# Patient Record
Sex: FEMALE | Race: WHITE | Employment: FULL TIME | URBAN - METROPOLITAN AREA
[De-identification: names, ages, dates, MRNs, and addresses within clinical notes are randomized per-mention and may not be internally consistent; named-entity substitution may affect disease eponyms.]

---

## 2020-10-11 ENCOUNTER — APPOINTMENT (OUTPATIENT)
Dept: LAB | Facility: HOSPITAL | Age: 70
End: 2020-10-11
Attending: NURSE PRACTITIONER
Payer: MEDICARE

## 2020-10-11 DIAGNOSIS — Z01.818 PRE-OP TESTING: ICD-10-CM

## 2020-10-11 PROCEDURE — 84443 ASSAY THYROID STIM HORMONE: CPT | Performed by: NURSE PRACTITIONER

## 2020-10-11 PROCEDURE — 80053 COMPREHEN METABOLIC PANEL: CPT | Performed by: NURSE PRACTITIONER

## 2020-10-11 PROCEDURE — 85025 COMPLETE CBC W/AUTO DIFF WBC: CPT | Performed by: NURSE PRACTITIONER

## 2020-10-11 PROCEDURE — 36415 COLL VENOUS BLD VENIPUNCTURE: CPT | Performed by: NURSE PRACTITIONER

## 2020-10-11 PROCEDURE — 86140 C-REACTIVE PROTEIN: CPT | Performed by: NURSE PRACTITIONER

## 2020-10-14 PROBLEM — R19.7 DIARRHEA, UNSPECIFIED: Status: ACTIVE | Noted: 2020-10-14

## 2023-05-16 ENCOUNTER — OFFICE VISIT (OUTPATIENT)
Facility: LOCATION | Age: 73
End: 2023-05-16
Payer: MEDICARE

## 2023-05-16 VITALS — HEART RATE: 101 BPM | TEMPERATURE: 97 F

## 2023-05-16 DIAGNOSIS — K43.9 VENTRAL HERNIA WITHOUT OBSTRUCTION OR GANGRENE: Primary | ICD-10-CM

## 2023-05-16 PROCEDURE — 1160F RVW MEDS BY RX/DR IN RCRD: CPT | Performed by: STUDENT IN AN ORGANIZED HEALTH CARE EDUCATION/TRAINING PROGRAM

## 2023-05-16 PROCEDURE — 1159F MED LIST DOCD IN RCRD: CPT | Performed by: STUDENT IN AN ORGANIZED HEALTH CARE EDUCATION/TRAINING PROGRAM

## 2023-05-16 PROCEDURE — 99203 OFFICE O/P NEW LOW 30 MIN: CPT | Performed by: STUDENT IN AN ORGANIZED HEALTH CARE EDUCATION/TRAINING PROGRAM

## 2023-06-26 ENCOUNTER — HOSPITAL ENCOUNTER (OUTPATIENT)
Dept: CT IMAGING | Age: 73
Discharge: HOME | End: 2023-06-26
Attending: STUDENT IN AN ORGANIZED HEALTH CARE EDUCATION/TRAINING PROGRAM
Payer: MEDICARE

## 2023-06-26 ENCOUNTER — TELEPHONE (OUTPATIENT)
Facility: LOCATION | Age: 73
End: 2023-06-26

## 2023-06-26 DIAGNOSIS — K43.9 VENTRAL HERNIA WITHOUT OBSTRUCTION OR GANGRENE: ICD-10-CM

## 2023-06-26 LAB
CREAT BLD-MCNC: 1 MG/DL
GFR SERPLBLD BASED ON 1.73 SQ M-ARVRAT: 59 ML/MIN/1.73M2 (ref 60–?)

## 2023-06-26 PROCEDURE — 82565 ASSAY OF CREATININE: CPT

## 2023-06-26 PROCEDURE — 74177 CT ABD & PELVIS W/CONTRAST: CPT | Performed by: STUDENT IN AN ORGANIZED HEALTH CARE EDUCATION/TRAINING PROGRAM

## 2023-06-26 NOTE — TELEPHONE ENCOUNTER
Reviewed chart, per last OV note only CT was ordered and discussed. Called and left detailed message nothing from Dr. Vanessa Loja was noted about   EKG, however if another dept called and told her asked pt to call back and we can give her PAT dept number.

## 2023-06-26 NOTE — TELEPHONE ENCOUNTER
Hi,  The patient called in and she is scheduled to have hernia surgery with , but she was suppose to have an EKG done before the surgery and the order still have not been sent. She was wondering when will she have the EKG done. In all just want a confirmation.     Call back # [phone]

## 2023-07-03 RX ORDER — ROSUVASTATIN CALCIUM 10 MG/1
10 TABLET, COATED ORAL NIGHTLY
COMMUNITY

## 2023-07-03 RX ORDER — LEVOTHYROXINE SODIUM 112 UG/1
112 TABLET ORAL
COMMUNITY

## 2023-07-11 ENCOUNTER — TELEPHONE (OUTPATIENT)
Facility: LOCATION | Age: 73
End: 2023-07-11

## 2023-07-11 NOTE — TELEPHONE ENCOUNTER
Transaction ID: [id number] ID: [id number] Date: 2023-07-11  Shyla Hugo Patient  Member ID  [id number]    Date of Birth  [id number]    Gender  Female    Eligibility Status  Active Coverage    Group Number  [id number]    Plan / Coverage Date  2023-01-01    Transaction Type  Outpatient Authorization    Organization  [id number] (COMMERCIAL & MEDICARE)    [facility] logo     Certificate Information  Reference Number  NA    Status  NO ACTION REQUIRED    Member Information  Patient Name  Shyla Hugo    Patient Date of Birth  [id number]    Patient Gender  Female    Member ID  [id number]    Relationship to [address] Name  Shyla Hugo    Requesting Provider     Name  [address] [place] [address], [address]  [phone]    Provider Role  Provider    Address  [facility], [address] [facility], [address] [facility]    Phone  [phone]  Fax  [phone]    Contact Name  Ashley Mercedes  of [facility]  22 - On [address] [place] [address]    Diagnosis Code 1  D896 - Ventral hernia without obstruction or gangrene    Procedure Code 1 (CPT/HCPCS)  13099 - RPR AA HRN 1ST 3-10 NCR/STRN    Quantity  1 Units    Procedure From - To Date  2023-07-12    Status  NO ACTION REQUIRED    Message  NO PRECERT REQUIRED PLEASE REFER TO THE PROVIDER CODE SEARCH TOOL ON AET WEBSITE THE REQUESTED SERVICE MAY NOT BE ELIGIBLE FOR COVERAGE REFER TO ONLINE CLINICAL POLICY BULLETINS USING [address]    Rendering Provider/Facility     Provider 1  Name  [address] [place] [address], [address]  [phone]    Provider Role  Attending    Provider 2  Name  [facility]  [phone]    Provider Role  Facility

## 2023-07-12 ENCOUNTER — ANESTHESIA EVENT (OUTPATIENT)
Dept: SURGERY | Facility: HOSPITAL | Age: 73
End: 2023-07-12
Payer: MEDICARE

## 2023-07-12 ENCOUNTER — HOSPITAL ENCOUNTER (OUTPATIENT)
Facility: HOSPITAL | Age: 73
Setting detail: HOSPITAL OUTPATIENT SURGERY
Discharge: HOME | End: 2023-07-12
Attending: STUDENT IN AN ORGANIZED HEALTH CARE EDUCATION/TRAINING PROGRAM | Admitting: STUDENT IN AN ORGANIZED HEALTH CARE EDUCATION/TRAINING PROGRAM
Payer: MEDICARE

## 2023-07-12 ENCOUNTER — ANESTHESIA (OUTPATIENT)
Dept: SURGERY | Facility: HOSPITAL | Age: 73
End: 2023-07-12
Payer: MEDICARE

## 2023-07-12 VITALS
SYSTOLIC BLOOD PRESSURE: 100 MMHG | HEART RATE: 60 BPM | BODY MASS INDEX: 26.79 KG/M2 | DIASTOLIC BLOOD PRESSURE: 53 MMHG | WEIGHT: 156.94 LBS | TEMPERATURE: 97 F | OXYGEN SATURATION: 94 % | HEIGHT: 64 IN | RESPIRATION RATE: 18 BRPM

## 2023-07-12 PROBLEM — R19.7 DIARRHEA, UNSPECIFIED: Status: RESOLVED | Noted: 2020-10-14 | Resolved: 2023-07-12

## 2023-07-12 PROCEDURE — 49593 RPR AA HRN 1ST 3-10 RDC: CPT | Performed by: STUDENT IN AN ORGANIZED HEALTH CARE EDUCATION/TRAINING PROGRAM

## 2023-07-12 PROCEDURE — 8E0W4CZ ROBOTIC ASSISTED PROCEDURE OF TRUNK REGION, PERCUTANEOUS ENDOSCOPIC APPROACH: ICD-10-PCS | Performed by: STUDENT IN AN ORGANIZED HEALTH CARE EDUCATION/TRAINING PROGRAM

## 2023-07-12 PROCEDURE — 0WUF4JZ SUPPLEMENT ABDOMINAL WALL WITH SYNTHETIC SUBSTITUTE, PERCUTANEOUS ENDOSCOPIC APPROACH: ICD-10-PCS | Performed by: STUDENT IN AN ORGANIZED HEALTH CARE EDUCATION/TRAINING PROGRAM

## 2023-07-12 PROCEDURE — 49593 RPR AA HRN 1ST 3-10 RDC: CPT

## 2023-07-12 DEVICE — GORE SYNECOR PREPERITONEAL 10CMX15CM OVAL BIOMATERIAL
Type: IMPLANTABLE DEVICE | Site: ABDOMEN | Status: FUNCTIONAL
Brand: GORE SYNECOR PREPERITONEAL BIOMATERIAL

## 2023-07-12 RX ORDER — DIPHENHYDRAMINE HYDROCHLORIDE 50 MG/ML
12.5 INJECTION INTRAMUSCULAR; INTRAVENOUS AS NEEDED
Status: DISCONTINUED | OUTPATIENT
Start: 2023-07-12 | End: 2023-07-12

## 2023-07-12 RX ORDER — SODIUM CHLORIDE, SODIUM LACTATE, POTASSIUM CHLORIDE, CALCIUM CHLORIDE 600; 310; 30; 20 MG/100ML; MG/100ML; MG/100ML; MG/100ML
INJECTION, SOLUTION INTRAVENOUS CONTINUOUS
Status: DISCONTINUED | OUTPATIENT
Start: 2023-07-12 | End: 2023-07-12

## 2023-07-12 RX ORDER — HYDROCODONE BITARTRATE AND ACETAMINOPHEN 5; 325 MG/1; MG/1
2 TABLET ORAL ONCE AS NEEDED
Status: COMPLETED | OUTPATIENT
Start: 2023-07-12 | End: 2023-07-12

## 2023-07-12 RX ORDER — ONDANSETRON 2 MG/ML
4 INJECTION INTRAMUSCULAR; INTRAVENOUS EVERY 6 HOURS PRN
Status: DISCONTINUED | OUTPATIENT
Start: 2023-07-12 | End: 2023-07-12

## 2023-07-12 RX ORDER — CEFAZOLIN SODIUM/WATER 2 G/20 ML
2 SYRINGE (ML) INTRAVENOUS ONCE
Status: COMPLETED | OUTPATIENT
Start: 2023-07-12 | End: 2023-07-12

## 2023-07-12 RX ORDER — HEPARIN SODIUM 5000 [USP'U]/ML
5000 INJECTION, SOLUTION INTRAVENOUS; SUBCUTANEOUS ONCE
Status: COMPLETED | OUTPATIENT
Start: 2023-07-12 | End: 2023-07-12

## 2023-07-12 RX ORDER — ACETAMINOPHEN 325 MG/1
650 TABLET ORAL ONCE
Status: DISCONTINUED | OUTPATIENT
Start: 2023-07-12 | End: 2023-07-12

## 2023-07-12 RX ORDER — ACETAMINOPHEN 500 MG
1000 TABLET ORAL ONCE AS NEEDED
Status: COMPLETED | OUTPATIENT
Start: 2023-07-12 | End: 2023-07-12

## 2023-07-12 RX ORDER — HYDROMORPHONE HYDROCHLORIDE 1 MG/ML
0.6 INJECTION, SOLUTION INTRAMUSCULAR; INTRAVENOUS; SUBCUTANEOUS EVERY 5 MIN PRN
Status: DISCONTINUED | OUTPATIENT
Start: 2023-07-12 | End: 2023-07-12

## 2023-07-12 RX ORDER — CYCLOBENZAPRINE HCL 5 MG
5 TABLET ORAL 3 TIMES DAILY
Qty: 15 TABLET | Refills: 0 | Status: SHIPPED | OUTPATIENT
Start: 2023-07-12

## 2023-07-12 RX ORDER — METOCLOPRAMIDE HYDROCHLORIDE 5 MG/ML
10 INJECTION INTRAMUSCULAR; INTRAVENOUS EVERY 8 HOURS PRN
Status: DISCONTINUED | OUTPATIENT
Start: 2023-07-12 | End: 2023-07-12

## 2023-07-12 RX ORDER — GLYCOPYRROLATE 0.2 MG/ML
INJECTION, SOLUTION INTRAMUSCULAR; INTRAVENOUS AS NEEDED
Status: DISCONTINUED | OUTPATIENT
Start: 2023-07-12 | End: 2023-07-12 | Stop reason: SURG

## 2023-07-12 RX ORDER — LABETALOL HYDROCHLORIDE 5 MG/ML
5 INJECTION, SOLUTION INTRAVENOUS EVERY 5 MIN PRN
Status: DISCONTINUED | OUTPATIENT
Start: 2023-07-12 | End: 2023-07-12

## 2023-07-12 RX ORDER — MEPERIDINE HYDROCHLORIDE 25 MG/ML
12.5 INJECTION INTRAMUSCULAR; INTRAVENOUS; SUBCUTANEOUS AS NEEDED
Status: DISCONTINUED | OUTPATIENT
Start: 2023-07-12 | End: 2023-07-12

## 2023-07-12 RX ORDER — MIDAZOLAM HYDROCHLORIDE 1 MG/ML
INJECTION INTRAMUSCULAR; INTRAVENOUS AS NEEDED
Status: DISCONTINUED | OUTPATIENT
Start: 2023-07-12 | End: 2023-07-12 | Stop reason: SURG

## 2023-07-12 RX ORDER — ROCURONIUM BROMIDE 10 MG/ML
INJECTION, SOLUTION INTRAVENOUS AS NEEDED
Status: DISCONTINUED | OUTPATIENT
Start: 2023-07-12 | End: 2023-07-12 | Stop reason: SURG

## 2023-07-12 RX ORDER — NALOXONE HYDROCHLORIDE 0.4 MG/ML
80 INJECTION, SOLUTION INTRAMUSCULAR; INTRAVENOUS; SUBCUTANEOUS AS NEEDED
Status: DISCONTINUED | OUTPATIENT
Start: 2023-07-12 | End: 2023-07-12

## 2023-07-12 RX ORDER — NEOSTIGMINE METHYLSULFATE 1 MG/ML
INJECTION, SOLUTION INTRAVENOUS AS NEEDED
Status: DISCONTINUED | OUTPATIENT
Start: 2023-07-12 | End: 2023-07-12 | Stop reason: SURG

## 2023-07-12 RX ORDER — HYDROMORPHONE HYDROCHLORIDE 1 MG/ML
0.4 INJECTION, SOLUTION INTRAMUSCULAR; INTRAVENOUS; SUBCUTANEOUS EVERY 5 MIN PRN
Status: DISCONTINUED | OUTPATIENT
Start: 2023-07-12 | End: 2023-07-12

## 2023-07-12 RX ORDER — OXYCODONE HYDROCHLORIDE 5 MG/1
5 TABLET ORAL EVERY 6 HOURS PRN
Qty: 30 TABLET | Refills: 0 | Status: SHIPPED | OUTPATIENT
Start: 2023-07-12 | End: 2023-07-12

## 2023-07-12 RX ORDER — HYDROCODONE BITARTRATE AND ACETAMINOPHEN 5; 325 MG/1; MG/1
1 TABLET ORAL ONCE AS NEEDED
Status: COMPLETED | OUTPATIENT
Start: 2023-07-12 | End: 2023-07-12

## 2023-07-12 RX ORDER — BUPIVACAINE HYDROCHLORIDE 2.5 MG/ML
INJECTION, SOLUTION EPIDURAL; INFILTRATION; INTRACAUDAL AS NEEDED
Status: DISCONTINUED | OUTPATIENT
Start: 2023-07-12 | End: 2023-07-12 | Stop reason: HOSPADM

## 2023-07-12 RX ORDER — HYDROMORPHONE HYDROCHLORIDE 1 MG/ML
0.2 INJECTION, SOLUTION INTRAMUSCULAR; INTRAVENOUS; SUBCUTANEOUS EVERY 5 MIN PRN
Status: DISCONTINUED | OUTPATIENT
Start: 2023-07-12 | End: 2023-07-12

## 2023-07-12 RX ORDER — ONDANSETRON 2 MG/ML
INJECTION INTRAMUSCULAR; INTRAVENOUS AS NEEDED
Status: DISCONTINUED | OUTPATIENT
Start: 2023-07-12 | End: 2023-07-12 | Stop reason: SURG

## 2023-07-12 RX ORDER — ACETAMINOPHEN 500 MG
1000 TABLET ORAL ONCE
Status: DISCONTINUED | OUTPATIENT
Start: 2023-07-12 | End: 2023-07-12 | Stop reason: HOSPADM

## 2023-07-12 RX ORDER — KETOROLAC TROMETHAMINE 30 MG/ML
INJECTION, SOLUTION INTRAMUSCULAR; INTRAVENOUS AS NEEDED
Status: DISCONTINUED | OUTPATIENT
Start: 2023-07-12 | End: 2023-07-12 | Stop reason: SURG

## 2023-07-12 RX ORDER — HYDROMORPHONE HYDROCHLORIDE 1 MG/ML
INJECTION, SOLUTION INTRAMUSCULAR; INTRAVENOUS; SUBCUTANEOUS
Status: COMPLETED
Start: 2023-07-12 | End: 2023-07-12

## 2023-07-12 RX ORDER — DEXAMETHASONE SODIUM PHOSPHATE 4 MG/ML
VIAL (ML) INJECTION AS NEEDED
Status: DISCONTINUED | OUTPATIENT
Start: 2023-07-12 | End: 2023-07-12 | Stop reason: SURG

## 2023-07-12 RX ORDER — OXYCODONE HYDROCHLORIDE 5 MG/1
5 TABLET ORAL EVERY 6 HOURS PRN
Qty: 30 TABLET | Refills: 0 | Status: SHIPPED | OUTPATIENT
Start: 2023-07-12

## 2023-07-12 RX ORDER — LIDOCAINE HYDROCHLORIDE 10 MG/ML
INJECTION, SOLUTION EPIDURAL; INFILTRATION; INTRACAUDAL; PERINEURAL AS NEEDED
Status: DISCONTINUED | OUTPATIENT
Start: 2023-07-12 | End: 2023-07-12 | Stop reason: SURG

## 2023-07-12 RX ADMIN — CEFAZOLIN SODIUM/WATER 2 G: 2 G/20 ML SYRINGE (ML) INTRAVENOUS at 10:23:00

## 2023-07-12 RX ADMIN — SODIUM CHLORIDE, SODIUM LACTATE, POTASSIUM CHLORIDE, CALCIUM CHLORIDE: 600; 310; 30; 20 INJECTION, SOLUTION INTRAVENOUS at 10:32:00

## 2023-07-12 RX ADMIN — MIDAZOLAM HYDROCHLORIDE 2 MG: 1 INJECTION INTRAMUSCULAR; INTRAVENOUS at 10:12:00

## 2023-07-12 RX ADMIN — NEOSTIGMINE METHYLSULFATE 3 MG: 1 INJECTION, SOLUTION INTRAVENOUS at 12:45:00

## 2023-07-12 RX ADMIN — ONDANSETRON 4 MG: 2 INJECTION INTRAMUSCULAR; INTRAVENOUS at 10:31:00

## 2023-07-12 RX ADMIN — KETOROLAC TROMETHAMINE 15 MG: 30 INJECTION, SOLUTION INTRAMUSCULAR; INTRAVENOUS at 12:43:00

## 2023-07-12 RX ADMIN — ROCURONIUM BROMIDE 50 MG: 10 INJECTION, SOLUTION INTRAVENOUS at 10:14:00

## 2023-07-12 RX ADMIN — LIDOCAINE HYDROCHLORIDE 50 MG: 10 INJECTION, SOLUTION EPIDURAL; INFILTRATION; INTRACAUDAL; PERINEURAL at 10:14:00

## 2023-07-12 RX ADMIN — SODIUM CHLORIDE, SODIUM LACTATE, POTASSIUM CHLORIDE, CALCIUM CHLORIDE: 600; 310; 30; 20 INJECTION, SOLUTION INTRAVENOUS at 10:10:00

## 2023-07-12 RX ADMIN — DEXAMETHASONE SODIUM PHOSPHATE 8 MG: 4 MG/ML VIAL (ML) INJECTION at 10:31:00

## 2023-07-12 RX ADMIN — GLYCOPYRROLATE 0.3 MG: 0.2 INJECTION, SOLUTION INTRAMUSCULAR; INTRAVENOUS at 10:25:00

## 2023-07-12 RX ADMIN — GLYCOPYRROLATE 0.4 MG: 0.2 INJECTION, SOLUTION INTRAMUSCULAR; INTRAVENOUS at 12:45:00

## 2023-07-12 NOTE — H&P
New Patient Visit Note       Active Problems      No diagnosis found. Chief Complaint   No chief complaint on file. History of Present Illness   74year-old very pleasant lady who was referred to me by Dr. Mallika Hung for evaluation of ventral hernia. She reports noting this hernia for a while however has been recently more notable after some weight loss. She is also had increased her exercise regimen and is performing core exercises which causes discomfort and pain at the area of the hernia lately. To me she reports the pain is mild and intermittent and aggravated with movement. She denies any fevers chills, nausea or vomiting, diarrhea or constipation, signs of incarceration of the hernia erythema in the area. She had prior laparoscopic surgery for tubal ligation and a duct ectomy of the right breast.  She is current with her colonoscopy screening. She is here for planned procedure. No changes since her office visit with me. Allergies  Shilpa Jobs has No Known Allergies. Past Medical / Surgical / Social / Family History    The past medical and past surgical history have been reviewed by me today.     Past Medical History:   Diagnosis Date    allergic rhinitis     anemia     in past and egd and colon neg    Arthritis     Cancer ([facility] [address]     basal cell cancer    Depression     Esophageal reflux     Fatigue     Food intolerance     gluten     gluten    High cholesterol     hypothyroidism     Irregular bowel habits     osteopenia     Osteoporosis     Sleep apnea     cpap    Thyroid disease      Past Surgical History:   Procedure Laterality Date    CATARACT  04/2021    both eyes    COLONOSCOPY      2002 Dr. Waleska Sung    COLONOSCOPY  01/2010    due 2015    OTHER  2000    ductectomy rt breast    OTHER SURGICAL HISTORY      laproscopy 84, cervical dysplasia with cone biopsy 84    OTHER SURGICAL HISTORY Bilateral 2018    oopherectomy    SKIN SURGERY  12/07/2016    MMS for BCC-infil to the  Nares-AB    SKIN SURGERY      BCC to Left Superior Nasolabial Fold [facility] dr Ed Arciniega       The family history and social history have been reviewed by me today. Family History   Problem Relation Age of Onset    Diabetes Father     Hypertension Mother     Cancer Mother         melanoma    Cancer Brother         melanoma     Social History    Socioeconomic History      Marital status:     Tobacco Use      Smoking status: Never      Smokeless tobacco: Never    Vaping Use      Vaping Use: Never used    Substance and Sexual Activity      Alcohol use: Yes        Alcohol/week: 2.0 standard drinks of alcohol        Types: 2 Glasses of wine per week      Drug use: No     No current outpatient medications on file. Review of Systems  The Review of Systems has been reviewed by me during today. Review of Systems   Constitutional:  Negative for chills, diaphoresis, fatigue and fever. HENT:  Negative for ear discharge, ear pain and sore throat. Eyes:  Negative for pain and discharge. Respiratory:  Negative for cough, chest tightness and shortness of breath. Cardiovascular:  Negative for chest pain, palpitations and leg swelling. Gastrointestinal:  Negative for abdominal distention, abdominal pain, blood in stool, constipation, diarrhea, nausea and vomiting. Genitourinary:  Negative for dysuria, frequency, hematuria and urgency. Skin:  Negative for color change, pallor and rash. Neurological:  Negative for weakness, light-headedness, numbness and headaches. Hematological:  Negative for adenopathy. Does not bruise/bleed easily. Psychiatric/Behavioral:  Negative for agitation and confusion. Physical Findings   Physical Exam  Constitutional:       Appearance: Normal appearance. HENT:      Head: Normocephalic and atraumatic. Cardiovascular:      Pulses: Normal pulses. Pulmonary:      Effort: Pulmonary effort is normal.   Abdominal:      General: Abdomen is flat. There is no distension.       Palpations: Abdomen is soft. There is no hepatomegaly or splenomegaly. Tenderness: There is abdominal tenderness in the suprapubic area. There is no guarding or rebound. Hernia: A hernia is present. Hernia is present in the ventral area. There is no hernia in the left inguinal area or right inguinal area. Skin:     General: Skin is warm. Capillary Refill: Capillary refill takes less than 2 seconds. Neurological:      Mental Status: She is alert and oriented to person, place, and time. Mental status is at baseline. Assessment   No diagnosis found. Bobbi Willoughby is a 74year old female referred by Dr. Sheeba Maxwell for evaluation of ventral hernia. She has a proximately 3 to 4 cm ventral hernia in the supraumbilical site. This is likely a an incisional hernia due to her prior laparoscopic surgery  This has become more uncomfortable for her lately and would like to have it repaired. I reviewed her CT scan in detail and provided my personal interpretation. There is a small umbilical defect in addition to the supraumbilical incisional hernia. I plan to repair both hernias and place a mesh appropriately. I discussed with her a robotic retrorectus or preperitoneal repair of the hernia with mesh placement   The risks of hernia repair were explained and include but are not limited to intra-operative and post-operative bleeding, post-operative wound infection, hernia recurrence, injury to adjacent organs and structures, possible bowel resection,  mesh complications, hematoma, seroma, mesh infection, possible requirement for mesh removal, cosmetic soft tissue defect, as well as the need for further therapeutic, diagnostic, or surgical intervention. The patient voiced understanding. All pertinent questions were answered to the patient's satisfaction after which willing and informed consent to proceed was obtained.          No orders of the defined types were placed in this encounter. Imaging & Referrals   VITAL SIGNS  NURSING COMMUNICATION  PLACE PIV  ACTIVITY AS TOLERATED  HEIGHT AND WEIGHT  INITIATE ADULT PREOP PROPHYLACTIC ABX PROTOCOL  VERIFY INFORMED CONSENT  NPO  VITAL SIGNS - NOTIFY PHYSICIAN    Follow Up  No follow-ups on file.     Mauricio Alonzo MD

## 2023-07-12 NOTE — ANESTHESIA PROCEDURE NOTES
Airway  Date/Time: 7/12/2023 10:14 AM  Urgency: elective    Airway not difficult    General Information and Staff    Patient location during procedure: OR  Anesthesiologist: Yandel Baltazar MD  Performed: anesthesiologist   Performed by: Yandel Baltazar MD  Authorized by: Yandel Baltazar MD      Indications and Patient Condition  Indications for airway management: anesthesia  Spontaneous Ventilation: absent  Sedation level: deep  Preoxygenated: yes  Patient position: sniffing  Mask difficulty assessment: 1 - vent by mask    Final Airway Details  Final airway type: endotracheal airway      Successful airway: ETT  Cuffed: yes   Successful intubation technique: direct laryngoscopy  Endotracheal tube insertion site: oral  Blade: GlideScope  Blade size: #3  ETT size (mm): 6.5    Placement verified by: capnometry   Cuff volume (mL): 8  Measured from: lips  ETT to lips (cm): 22  Number of attempts at approach: 1  Number of other approaches attempted: 0

## 2023-07-17 ENCOUNTER — MED REC SCAN ONLY (OUTPATIENT)
Facility: LOCATION | Age: 73
End: 2023-07-17

## 2023-07-25 ENCOUNTER — OFFICE VISIT (OUTPATIENT)
Facility: LOCATION | Age: 73
End: 2023-07-25

## 2023-07-25 DIAGNOSIS — Z98.890 POST-OPERATIVE STATE: Primary | ICD-10-CM

## 2023-07-25 PROCEDURE — 1159F MED LIST DOCD IN RCRD: CPT | Performed by: STUDENT IN AN ORGANIZED HEALTH CARE EDUCATION/TRAINING PROGRAM

## 2023-07-25 PROCEDURE — 99212 OFFICE O/P EST SF 10 MIN: CPT | Performed by: STUDENT IN AN ORGANIZED HEALTH CARE EDUCATION/TRAINING PROGRAM

## 2023-07-25 PROCEDURE — 1160F RVW MEDS BY RX/DR IN RCRD: CPT | Performed by: STUDENT IN AN ORGANIZED HEALTH CARE EDUCATION/TRAINING PROGRAM

## 2023-08-07 NOTE — PROGRESS NOTES
Post Operative Visit Note       Active Problems  1. Post-operative state         Chief Complaint   Patient presents with:  Post-Op:  ventral Hernia 7/12 surgery follow up pt denies any p/o concerns          History of Present Illness   74year old female sp robotic ventral hernia repair with mesh on 7/12 presents for postop follow up. She deneis fevers chills , nausea or vomiting. Some abdominal discomfort that is improving  Having bms and tolerating diet      Allergies  Charlee Grey has No Known Allergies. Past Medical / Surgical / Social / Family History    The past medical and past surgical history have been reviewed by me today. Past Medical History:   Diagnosis Date    allergic rhinitis     anemia     in past and egd and colon neg    Arthritis     Cancer ([facility] [address]     basal cell cancer    Depression     Esophageal reflux     Fatigue     Food intolerance     gluten     gluten    High cholesterol     hypothyroidism     Irregular bowel habits     osteopenia     Osteoporosis     Sleep apnea     cpap     Past Surgical History:   Procedure Laterality Date    CATARACT  04/2021    both eyes    COLONOSCOPY      2002 Dr. Archana Castillo    COLONOSCOPY  01/2010    due 2015    OTHER  2000    ductectomy rt breast    OTHER SURGICAL HISTORY      laproscopy 84, cervical dysplasia with cone biopsy 84    OTHER SURGICAL HISTORY Bilateral 2018    oopherectomy    SKIN SURGERY  12/07/2016    MMS for BCC-infil to the L Nares-AB    SKIN SURGERY      BCC to Left Superior Nasolabial Fold mms dr Callie Goyal       The family history and social history have been reviewed by me today. Family History   Problem Relation Age of Onset    Diabetes Father     Hypertension Mother     Cancer Mother         melanoma    Cancer Brother         melanoma     Social History    Socioeconomic History      Marital status:      Tobacco Use      Smoking status: Never      Smokeless tobacco: Never    Vaping Use      Vaping Use: Never used    Substance and Sexual Activity      Alcohol use: Yes        Alcohol/week: 2.0 standard drinks of alcohol        Types: 2 Glasses of wine per week      Drug use: No       Current Outpatient Medications:     oxyCODONE 5 MG Oral Tab, Take 1 tablet (5 mg total) by mouth every 6 (six) hours as needed for Pain., Disp: 30 tablet, Rfl: 0    cyclobenzaprine 5 MG Oral Tab, Take 1 tablet (5 mg total) by mouth 3 (three) times daily. , Disp: 15 tablet, Rfl: 0    Cholecalciferol 125 MCG (5000 UT) Oral Tab, Take 1 tablet (5,000 Units total) by mouth daily. , Disp: , Rfl:     levothyroxine 112 MCG Oral Tab, Take 1 tablet (112 mcg total) by mouth before breakfast., Disp: , Rfl:     rosuvastatin 10 MG Oral Tab, Take 1 tablet (10 mg total) by mouth nightly., Disp: , Rfl:     Calcium Carbonate-Vit D-Min (CALTRATE 600+D PLUS MINERALS OR), Take by mouth daily. , Disp: , Rfl:     Multiple Vitamin (MULTIVITAMIN ADULT OR), Take 1 tablet by mouth daily. , Disp: , Rfl:     Nutritional Supplements (JUICE PLUS FIBRE OR), Take by mouth 2 (two) times a day., Disp: , Rfl:     alendronate 70 MG Oral Tab, 1 tablet (70 mg total) once a week. On Sundays, Disp: , Rfl:     Sertraline HCl (ZOLOFT) 100 MG Oral Tab, Take 1 tablet (100 mg total) by mouth daily. , Disp: , Rfl:     BuPROPion HCl (WELLBUTRIN XL) 150 MG Oral Tablet SR 24 Hr, Take  by mouth daily. , Disp: , Rfl:     FISH OIL CONCENTRATE 1000 MG OR CAPS, 6 grams daily, Disp: , Rfl:       Review of Systems  A 10 point Review of Systems has been completed by me today and is negative except as above in the HPI. Physical Findings   There were no vitals taken for this visit.   Gen/psych: alert and oriented, cooperative, no apparent distress  Cardiovascular: regular rate  Respiratory: respirations unlabored, no wheeze  Abdominal: soft, non-tender, non-distended, no guarding/rebound, no hernia on valsalva  Incisions: laparoscopic incisions clean dry and intact without erythema          Assessment/Plan  Post-operative state (primary encounter diagnosis)    74year old female sp robotic ventral hernia repair on 7 /12  Doing well  Minimal discomfort from surgery to be expected , recovering well  Will continue to avoid heavy lifting for 6 weeks post op  I have no further follow-up scheduled with this patient at this time. This patient can see me on an as-needed basis. This patient should return urgently for any problems or complications related to my surgical intervention. No orders of the defined types were placed in this encounter. Imaging & Referrals   None    Follow Up  Return if symptoms worsen or fail to improve.     Sudha Han MD  [facility] General Surgery  8/7/2023  2:18 AM

## 2024-01-22 ENCOUNTER — ORDER TRANSCRIPTION (OUTPATIENT)
Dept: ADMINISTRATIVE | Facility: HOSPITAL | Age: 74
End: 2024-01-22

## 2024-01-22 ENCOUNTER — OFFICE VISIT (OUTPATIENT)
Dept: INTERNAL MEDICINE CLINIC | Facility: CLINIC | Age: 74
End: 2024-01-22
Payer: MEDICARE

## 2024-01-22 ENCOUNTER — LAB ENCOUNTER (OUTPATIENT)
Dept: LAB | Age: 74
End: 2024-01-22
Attending: INTERNAL MEDICINE
Payer: MEDICARE

## 2024-01-22 VITALS
TEMPERATURE: 97 F | SYSTOLIC BLOOD PRESSURE: 110 MMHG | WEIGHT: 156.38 LBS | DIASTOLIC BLOOD PRESSURE: 62 MMHG | HEART RATE: 66 BPM | HEIGHT: 64 IN | BODY MASS INDEX: 26.7 KG/M2 | RESPIRATION RATE: 16 BRPM | OXYGEN SATURATION: 98 %

## 2024-01-22 DIAGNOSIS — E55.9 VITAMIN D DEFICIENCY: ICD-10-CM

## 2024-01-22 DIAGNOSIS — Z12.31 ENCOUNTER FOR SCREENING MAMMOGRAM FOR MALIGNANT NEOPLASM OF BREAST: Primary | ICD-10-CM

## 2024-01-22 DIAGNOSIS — Z13.6 SCREENING FOR CARDIOVASCULAR CONDITION: Primary | ICD-10-CM

## 2024-01-22 DIAGNOSIS — Z85.828 PERSONAL HISTORY OF OTHER MALIGNANT NEOPLASM OF SKIN: ICD-10-CM

## 2024-01-22 DIAGNOSIS — G47.33 OSA (OBSTRUCTIVE SLEEP APNEA): ICD-10-CM

## 2024-01-22 DIAGNOSIS — Z82.49 FAMILY HISTORY OF HEART DISEASE: ICD-10-CM

## 2024-01-22 DIAGNOSIS — M81.0 AGE-RELATED OSTEOPOROSIS WITHOUT CURRENT PATHOLOGICAL FRACTURE: ICD-10-CM

## 2024-01-22 DIAGNOSIS — F32.A DEPRESSION, UNSPECIFIED DEPRESSION TYPE: ICD-10-CM

## 2024-01-22 DIAGNOSIS — E53.8 VITAMIN B12 DEFICIENCY: ICD-10-CM

## 2024-01-22 DIAGNOSIS — E78.5 DYSLIPIDEMIA: ICD-10-CM

## 2024-01-22 LAB — VIT B12 SERPL-MCNC: 638 PG/ML (ref 193–986)

## 2024-01-22 PROCEDURE — 36415 COLL VENOUS BLD VENIPUNCTURE: CPT | Performed by: INTERNAL MEDICINE

## 2024-01-22 PROCEDURE — 82607 VITAMIN B-12: CPT | Performed by: INTERNAL MEDICINE

## 2024-01-22 NOTE — PROGRESS NOTES
Gena Denis  6/18/1950    No chief complaint on file.    SUBJECTIVE   Gena Denis is a 73 year old female who presents to [facility] care.    Past Medical History: YESI, B12 deficiency, vitamin D deficiency, hypothyroidism, hyperlipidemia, depression, history of basal cell carcinoma, osteoporosis  Past Surgical History: ventral hernia repair w/ mesh in 2023  Mediations: Reviewed in chart  Allergies: NKDA  Family History:   Mother: Hypertension   Father: Had MI in 40s   Sibling(s): Had MI in 50s  Social:   EtOH: Less than 7 glasses of wine per week   Tobacco: Never smoker    Interested in heart scan given family history of heart disease and personal history of HLD.  Has been getting B12 shots monthly and not sure if she has to continue. Will check a level.  Not feeing fatigued. Rather feeling great after exercising w/  3 times per week and walking at least 10 miles per week.    Has been on Alendronate for the last 1-2 years. Last DEXA T scores did not improve.    Has been seeing psychiatry for 30 years for depression. She is doing well on current regimen.    Review of Systems   Review of Systems   No f/c/chest pain or sob. No cough. No abd pain/n/v/d. No ha or dizziness. No numbness, tingling, or weakness. No other complaints today.    OBJECTIVE:   /62   Pulse 66   Temp 97.4 °F (36.3 °C) (Temporal)   Resp 16   Ht 5' 4\" (1.626 m)   Wt 156 lb 6.4 oz (70.9 kg)   SpO2 98%   BMI 26.85 kg/m²   Physical Exam   Constitutional: Oriented to person, place, and time. No distress.   Cardiovascular: Normal rate, regular rhythm and intact distal pulses.  No murmur, rubs or gallops.   Pulmonary/Chest: Effort normal and breath sounds normal. No respiratory distress.  Musculoskeletal: No edema    Lab Results   Component Value Date    GLU 94 10/11/2020    BUN 18 10/11/2020    CREATSERUM 1.08 (H) 10/11/2020    BUNCREA 16.7 10/11/2020    ANIONGAP 1 10/11/2020    GFRAA 59 (L) 10/11/2020    GFRNAA 51 (L)  10/11/2020    CA 9.1 10/11/2020     10/11/2020    K 4.6 10/11/2020     10/11/2020    CO2 27.0 10/11/2020    OSMOCALC 286 10/11/2020      Lab Results   Component Value Date    WBC 5.0 10/11/2020    RBC 4.86 10/11/2020    HGB 13.9 10/11/2020    HCT 44.1 10/11/2020    MCV 90.7 10/11/2020    MCH 28.6 10/11/2020    MCHC 31.5 10/11/2020    RDW 12.8 10/11/2020    .0 10/11/2020      Lab Results   Component Value Date    TSH 3.240 10/11/2020        ASSESSMENT AND PLAN:       ICD-10-CM    1. Encounter for screening mammogram for malignant neoplasm of breast  Z12.31 [facility] NORY 2D+3D SCREENING BILAT (CPT=77067/21560)      2. Vitamin B12 deficiency  E53.8 Vitamin B12 [E]      3. Dyslipidemia  E78.5 Continue Rosuvastatin. Repeat lipid panel annually,       4. Personal history of other malignant neoplasm of skin  Z85.828 Surveillance per Dermatology.      5. Age-related osteoporosis without current pathological fracture  M81.0 Continue Alendronate for now in addition to weightbearing exercising and Ca/Vit D supplementation. Repeat DEXA 1 year from previous.If no appreciable different in scores then consider referral to Endo.      6. YESI (obstructive sleep apnea)  G47.33 Continue CPAP. Management per Pulm.      7. Vitamin D deficiency  E55.9 Continue daily supplementation.      8. Depression, unspecified depression type  F32.A Management per psychiatry. She is doing well on current regimen.      9. Family history of heart disease  Z82.49 We discussed doing a heart scan. Pamphlet provided.        Return to clinic in the next 3-6 mo for MA.    The patient indicates understanding of these issues and agrees to the plan.  The patient is asked to return or present to the emergency room for worsening of symptoms.    TODAY'S ORDERS     No orders of the defined types were placed in this encounter.      Meds & Refills:  Requested Prescriptions      No prescriptions requested or ordered in this encounter       Imaging &  Consults:  NAZ CUETO 2D+3D SCREENING BILAT (CPT=77067/10298)    No follow-ups on file.  There are no Patient Instructions on file for this visit.    All questions were answered and the patient agrees with the plan.     Thank you,  Ra Kenny, DO

## 2024-01-31 ENCOUNTER — ORDER TRANSCRIPTION (OUTPATIENT)
Dept: ADMINISTRATIVE | Facility: HOSPITAL | Age: 74
End: 2024-01-31

## 2024-01-31 DIAGNOSIS — Z13.6 SCREENING FOR CARDIOVASCULAR CONDITION: Primary | ICD-10-CM

## 2024-02-06 ENCOUNTER — HOSPITAL ENCOUNTER (OUTPATIENT)
Dept: CT IMAGING | Facility: HOSPITAL | Age: 74
Discharge: HOME | End: 2024-02-06
Attending: INTERNAL MEDICINE

## 2024-02-06 DIAGNOSIS — Z13.6 SCREENING FOR CARDIOVASCULAR CONDITION: ICD-10-CM

## 2024-02-06 LAB
GLUCOSE POC: 84 MG/DL (ref 70–140)
HDL POC: 61 MG/DL (ref 55–75)
LDL POC: 94 MG/DL (ref 0–100)
TC/HDL RATIO: 2 (ref 0–5)
TOTAL CHOLESTEROL POC: 166 MG/DL (ref 0–200)
TRIGLYCERIDES POC: 54 MG/DL (ref 0–150)

## 2024-02-06 NOTE — PROGRESS NOTES
Date of Service 2/6/2024    DAVID TIDWELL  Date of Birth 6/18/1950    Patient Age: 73 year old    PCP: Ra Kenny,   [address] [place] [id number]    Heart Scan Consult  Preliminary Heart Scan Score: 13.6    Previous Screening  Heart Scan Completed Previously: No        Peripheral Vascular Scan Completed Previously: No          Risk Factors  Personal Risk Factors  Non-alterable Risk Factors: Family History (Father had his first MI in his 40's.  Brother had MI with Bypass X2 in his early 50's.)  Alterable Risk Factors: Abnormal Cholesterol;Obstructive sleep apnea      Body Mass Index  BMI 26    Blood Pressure  /62 no medication.  (Normal =< 120/80,  Elevated = 120-129/ >80,  High Stage1 130-139/80-89 , Stage2 >140/>90)    Lipid Profile  Patient was in fasting state: No    Cholesterol: 166, done on 2/6/2024.  HDL Cholesterol: 61, done on 2/6/2024.  LDL Cholesterol: 94, done on 2/6/2024.  TriGlycerides 54, done on 2/6/2024.    She is on cholesterol medication.  She is active with [facility] and is exercising 3 days a week.    Cholesterol Goals  Value   Total  =< 200   HDL  = > 45 Men = > 55 Women   LDL   =< 100   Triglycerides  =< 150       Glucose and Hemoglobin A1C  Lab Results   Component Value Date    GLU 84 02/06/2024     (Normal Fasting Glucose < 100mg/dl )    Nurse Review  Risk factor information and results reviewed with Nurse: Yes    Recommended Follow Up:  Consult your physician regarding:: Final Heart Scan Report;Discuss potential for Incidental Finding      Recommendations for Change:  Nutrition Changes: Low Saturated Fat;Increase Fiber    Cholesterol Modification (goal of therapy depends upon your risk): No Change Needed    Exercise: Enhance Current Program    Smoking Cessation: No Change Needed    Weight Management: Decrease Current Weight    Stress Management: Adopt Stress Management Techniques    Repeat Heart Scan: 3 Years if Calcium Score is > 0.0;Discuss with your  Physician              [place] Recommended Resources:  Recommended Resources: PV Screening;Upcoming Classes, Medical Services and Health Library [email or web address]  Recommended PV Screening: Priscila LOUISE RN        Please Contact the Nurse Heart Line with any Questions or Concerns [phone].

## 2024-02-13 ENCOUNTER — LAB ENCOUNTER (OUTPATIENT)
Dept: LAB | Age: 74
End: 2024-02-13
Attending: INTERNAL MEDICINE
Payer: MEDICARE

## 2024-02-13 DIAGNOSIS — Z11.3 SCREENING EXAMINATION FOR VENEREAL DISEASE: Primary | ICD-10-CM

## 2024-02-13 LAB
T PALLIDUM AB SER QL IA: NONREACTIVE
VIT B12 SERPL-MCNC: 592 PG/ML (ref 193–986)

## 2024-02-13 PROCEDURE — 82607 VITAMIN B-12: CPT | Performed by: INTERNAL MEDICINE

## 2024-02-13 PROCEDURE — 86780 TREPONEMA PALLIDUM: CPT

## 2024-02-13 PROCEDURE — 36415 COLL VENOUS BLD VENIPUNCTURE: CPT

## 2024-02-13 PROCEDURE — 87389 HIV-1 AG W/HIV-1&-2 AB AG IA: CPT

## 2024-02-15 ENCOUNTER — TELEMEDICINE (OUTPATIENT)
Dept: INTERNAL MEDICINE CLINIC | Facility: CLINIC | Age: 74
End: 2024-02-15
Payer: MEDICARE

## 2024-02-15 DIAGNOSIS — E53.8 B12 DEFICIENCY: Primary | ICD-10-CM

## 2024-02-15 DIAGNOSIS — R09.82 POST-NASAL DRIP: ICD-10-CM

## 2024-02-15 DIAGNOSIS — R93.1 AGATSTON CAC SCORE, <100: ICD-10-CM

## 2024-02-15 DIAGNOSIS — M81.0 AGE-RELATED OSTEOPOROSIS WITHOUT CURRENT PATHOLOGICAL FRACTURE: ICD-10-CM

## 2024-02-15 DIAGNOSIS — J47.9 BRONCHIECTASIS WITHOUT COMPLICATION (HCC): ICD-10-CM

## 2024-02-15 PROCEDURE — 99214 OFFICE O/P EST MOD 30 MIN: CPT | Performed by: INTERNAL MEDICINE

## 2024-02-15 RX ORDER — CYANOCOBALAMIN 1000 UG/ML
1000 INJECTION, SOLUTION INTRAMUSCULAR; SUBCUTANEOUS ONCE
Status: COMPLETED | OUTPATIENT
Start: 2024-02-15 | End: 2024-02-16

## 2024-02-15 NOTE — PROGRESS NOTES
Gena Denis is a 73 year old female.   HPI:    CT Calcium Score 14 in LAD. Has family history of heart disease and hypercholesterolemia well controlled on Rosuvastatin 10 mg.    Most recently B 12 was within normal limits but she is feeling more fatigued than normal and muscles/bones are taking longer to recover after work outs. Would like to resume monthly injections.    CT over read showed mild bronchiectasis w/ mucous plugging of the right middle low stable from 2004. Patient not aware of this diagnosis. Not coughing up mucous not short of breath. Has always felt like her lungs/breathing were not great.    Has been clearing her throat more often 2/2 post nasal drip. Not using any nasal sprays.    Her Ob/Gyne recommended Rheum referral for osteoporosis?      Allergies:  No Known Allergies   Current Meds:  Current Outpatient Medications   Medication Sig Dispense Refill    Cholecalciferol 125 MCG (5000 UT) Oral Tab Take 1 tablet (5,000 Units total) by mouth daily.      levothyroxine 112 MCG Oral Tab Take 1 tablet (112 mcg total) by mouth before breakfast.      rosuvastatin 10 MG Oral Tab Take 1 tablet (10 mg total) by mouth nightly.      Calcium Carbonate-Vit D-Min (CALTRATE 600+D PLUS MINERALS OR) Take by mouth daily.      Multiple Vitamin (MULTIVITAMIN ADULT OR) Take 1 tablet by mouth daily.      Nutritional Supplements (JUICE PLUS FIBRE OR) Take by mouth 2 (two) times a day.      alendronate 70 MG Oral Tab 1 tablet (70 mg total) once a week. On Sundays      Sertraline HCl (ZOLOFT) 100 MG Oral Tab Take 1 tablet (100 mg total) by mouth daily.      BuPROPion HCl (WELLBUTRIN XL) 150 MG Oral Tablet SR 24 Hr Take  by mouth daily.      FISH OIL CONCENTRATE 1000 MG OR CAPS 6 grams daily          PMH:     Past Medical History:   Diagnosis Date    allergic rhinitis     anemia     in past and egd and colon neg    Arthritis     Cancer (HCC)     basal cell cancer    Depression     Esophageal reflux     Fatigue     Food  intolerance     gluten     gluten    High cholesterol     hypothyroidism     Irregular bowel habits     osteopenia     Osteoporosis     Sleep apnea     cpap     ROS:   GENERAL: Negative for fever, chills and fatigue. NAD.  HENT: Negative for congestion, sore throat, and ear pain.  RESPIRATORY: Negative for cough, chest tightness, shortness of breath and wheezing.    CV: Negative for chest pain, palpitations and leg swelling.   GI: Negative for nausea, vomiting, abdominal pain, diarrhea, and blood in stool.   : Negative for dysuria, hematuria and difficulty urinating.   MUSCULOSKELETAL: See above, myalgias after exercise.   NEURO: Negative for dizziness, syncope, weakness, numbness, tingling and headaches.   PSYCH: The patient is not nervous/anxious. No depression.      PHYSICAL EXAM:   No vital signs or physical exam completed for this visit as visit was done via telehealth.       ASSESSMENT/ PLAN:       ICD-10-CM    1. B12 deficiency  E53.8 cyanocobalamin (Vitamin B12) 1000 MCG/ML injection 1,000 mcg   Can resume monthly injections for the next several months.   2. Age-related osteoporosis without current pathological fracture  M81.0 Endocrine Referral - In Network   Continue Alendronate for now and refer to Endo for poss alternative.   3. Post-nasal drip  R09.82    Instructed to try OTC intranasal glucocorticoid Flonase.   4. Agatston CAC score, <100  R93.1    Cont statin and aggressive lifestyle modifications including exercises and balanced diet.   5. Bronchiectasis without complication (HCC)  J47.9    Patient already seeing [facility] for [facility] and is going to follow up regarding this diagnosis. Not in exacerbation.       Health Maintenance Due   Topic Date Due    COVID-19 Vaccine (7 - 2023-24 season) 09/01/2023    MA Annual Health Assessment  01/01/2024    Fall Risk Screening (Annual)  01/01/2024         Pt indicates understanding and agrees to the plan.     No follow-ups on file.    Ra Kenny,  DO        Gena Denis understands phone evaluation is not a substitute for face-to-face examination or emergency care. Patient advised to go to ER or call 911 for worsening symptoms or acute distress.     Please note that the following visit was completed using two-way, real-time interactive  video communication.  This has been done in good shashank to provide continuity of care in the best interest of the provider-patient relationship, due to the on-going public health crisis/national emergency and because of restrictions of visitation.  There are limitations of this visit as no physical exam could be performed.  Every conscious effort was taken to allow for sufficient and adequate time.  This billing visit was spent on reviewing labs, medications, radiology tests and decision making.  Appropriate medical decision-making and tests are ordered as detailed in the plan of care above.

## 2024-02-16 ENCOUNTER — NURSE ONLY (OUTPATIENT)
Dept: INTERNAL MEDICINE CLINIC | Facility: CLINIC | Age: 74
End: 2024-02-16
Payer: MEDICARE

## 2024-02-16 RX ADMIN — CYANOCOBALAMIN 1000 MCG: 1000 INJECTION, SOLUTION INTRAMUSCULAR; SUBCUTANEOUS at 11:24:00

## 2024-03-15 ENCOUNTER — NURSE ONLY (OUTPATIENT)
Dept: INTERNAL MEDICINE CLINIC | Facility: CLINIC | Age: 74
End: 2024-03-15
Payer: MEDICARE

## 2024-03-15 DIAGNOSIS — E53.8 B12 DEFICIENCY: Primary | ICD-10-CM

## 2024-03-15 RX ORDER — CYANOCOBALAMIN 1000 UG/ML
1000 INJECTION, SOLUTION INTRAMUSCULAR; SUBCUTANEOUS ONCE
Status: COMPLETED | OUTPATIENT
Start: 2024-03-15 | End: 2024-03-15

## 2024-03-15 RX ADMIN — CYANOCOBALAMIN 1000 MCG: 1000 INJECTION, SOLUTION INTRAMUSCULAR; SUBCUTANEOUS at 13:03:00

## 2024-04-10 ENCOUNTER — NURSE ONLY (OUTPATIENT)
Dept: INTERNAL MEDICINE CLINIC | Facility: CLINIC | Age: 74
End: 2024-04-10
Payer: MEDICARE

## 2024-04-10 DIAGNOSIS — E53.8 VITAMIN B12 DEFICIENCY: Primary | ICD-10-CM

## 2024-04-10 PROCEDURE — 96372 THER/PROPH/DIAG INJ SC/IM: CPT | Performed by: INTERNAL MEDICINE

## 2024-04-10 RX ORDER — CYANOCOBALAMIN 1000 UG/ML
1000 INJECTION, SOLUTION INTRAMUSCULAR; SUBCUTANEOUS ONCE
Status: COMPLETED | OUTPATIENT
Start: 2024-04-10 | End: 2024-04-10

## 2024-04-10 RX ADMIN — CYANOCOBALAMIN 1000 MCG: 1000 INJECTION, SOLUTION INTRAMUSCULAR; SUBCUTANEOUS at 08:39:00

## 2024-05-02 ENCOUNTER — OFFICE VISIT (OUTPATIENT)
Facility: CLINIC | Age: 74
End: 2024-05-02
Payer: MEDICARE

## 2024-05-02 VITALS
WEIGHT: 158 LBS | BODY MASS INDEX: 26.98 KG/M2 | DIASTOLIC BLOOD PRESSURE: 82 MMHG | HEART RATE: 79 BPM | HEIGHT: 64 IN | OXYGEN SATURATION: 99 % | SYSTOLIC BLOOD PRESSURE: 114 MMHG

## 2024-05-02 DIAGNOSIS — M81.0 AGE-RELATED OSTEOPOROSIS WITHOUT CURRENT PATHOLOGICAL FRACTURE: Primary | ICD-10-CM

## 2024-05-02 DIAGNOSIS — E55.9 VITAMIN D DEFICIENCY: ICD-10-CM

## 2024-05-02 DIAGNOSIS — E03.8 HYPOTHYROIDISM DUE TO HASHIMOTO'S THYROIDITIS: ICD-10-CM

## 2024-05-02 DIAGNOSIS — E06.3 HYPOTHYROIDISM DUE TO HASHIMOTO'S THYROIDITIS: ICD-10-CM

## 2024-05-02 PROCEDURE — 99205 OFFICE O/P NEW HI 60 MIN: CPT | Performed by: STUDENT IN AN ORGANIZED HEALTH CARE EDUCATION/TRAINING PROGRAM

## 2024-05-02 RX ORDER — ESTRADIOL 10 UG/1
INSERT VAGINAL
COMMUNITY
Start: 2024-04-01

## 2024-05-02 NOTE — PATIENT INSTRUCTIONS
Return Visit   [  ] Physician in 4 weeks   [  ] In person or video visit  [  ] In person only    [ x ] After visit summary   [ x ] Placed labs/imaging. Labs are to be drawn at 8A and fasting.      It was great seeing you today!    Today we discussed your osteoporosis:  -We reviewed your history and risk factors  -We discussed your recent bone density   -Please complete your secondary evaluation and once your labs result, I will be in touch with next steps  -Continue your current vitamin D and calcium supplement       Take care!  -Dr. Hernandez

## 2024-05-02 NOTE — PROGRESS NOTES
ENDOCRINOLOGY, DIABETES & METABOLISM CONSULT NOTE   Date: 05/02/24  Name: Gena Denis   Referring Physician: No ref. provider found    Subjective:    HISTORY OF PRESENT ILLNESS:   Gena Denis is a 73 year old female seen in consultation for her osteoporosis    Patient presents to the clinic for an initial bone health evaluation due to a history of DEXA confirmed osteoporosis.     OSTEOPOROSIS RISK FACTORS ASSESSMENT  Postmenopausal Yes, age 51, was on HRT for 3 years   Maternal hx of osteoporosis or hx of parental hip fx:  Maternal grandmother passed away from bone cancer in 1963. She was diagnosed after a hip fracture.    Recent Fracture: Yes, compression fracture seen on CT last summer 2023 (unclear when this was from [id number])    Previous fracture after the age of 50:  No   Hx of kidney stones No   Frequent falls Yes, previously would fall twice a year but since she has been doing weight training and balance with professional  she notes improvement    Poor vision No   Decrease in height No   New or Worsening Back Pain NoIntermittent low back pain that resolves   History of Vit D insufficiency:   Current Vitamin D supplementation: No  Vitamin D 5000 units + K daily   Poor intake of calcium  Daily calcium intake: Yes, sensitivity to cows milk. Has 0.5 oz of goat cheese on toast   Calcium citrate 1200 mg total   Caffeine intake Yes, 36 oz of coffee daily   Smoking No   Alcohol intake >3/d:  No   Hx of thyroid disease Yes, hypothyroidism diagnosed around 50 + years ago   Hx of calcium problems or hyperparathyroidism No   Previous treatment for osteoporosis Yes, fosamax for the last 3 years   Malabsorption, chronic diarrhea, celiac sprue   No notes symptoms that could be consistent with celiac's   History of RA  No   History of skeletal radiation No   History of eating disorder No     Intake of medications that cause bone loss:  Long term steroid use: No  Aromatase inhibitor: No  Seizure medications:  No  GnRH agonist: No  PPI: No  Lithium: No  SSRI: No  Actos/Avandia: No    Treatment Contraindications:   Plans for dental procedures: around 3/2024; gets teeth cleaned every 3 months     Allergies, PMH, SocHx and FHx reviewed and updated as appropriate in Epic on    Estradiol 10 MCG Vaginal Tab Place one tablet into the vagina at bedtime for two weeks and then three times weekly      Cholecalciferol 125 MCG (5000 UT) Oral Tab Take 1 tablet (5,000 Units total) by mouth daily.      levothyroxine 112 MCG Oral Tab Take 1 tablet (112 mcg total) by mouth before breakfast.      rosuvastatin 10 MG Oral Tab Take 1 tablet (10 mg total) by mouth nightly.      Calcium Carbonate-Vit D-Min (CALTRATE 600+D PLUS MINERALS OR) Take by mouth daily.      Multiple Vitamin (MULTIVITAMIN ADULT OR) Take 1 tablet by mouth daily.      Nutritional Supplements (JUICE PLUS FIBRE OR) Take by mouth 2 (two) times a day.      alendronate 70 MG Oral Tab 1 tablet (70 mg total) once a week. On Sundays      Sertraline HCl (ZOLOFT) 100 MG Oral Tab Take 1 tablet (100 mg total) by mouth daily.      BuPROPion HCl (WELLBUTRIN XL) 150 MG Oral Tablet SR 24 Hr Take  by mouth daily.      FISH OIL CONCENTRATE 1000 MG OR CAPS 6 grams daily       No Known Allergies  Current Outpatient Medications   Medication Sig Dispense Refill    Estradiol 10 MCG Vaginal Tab Place one tablet into the vagina at bedtime for two weeks and then three times weekly      Cholecalciferol 125 MCG (5000 UT) Oral Tab Take 1 tablet (5,000 Units total) by mouth daily.      levothyroxine 112 MCG Oral Tab Take 1 tablet (112 mcg total) by mouth before breakfast.      rosuvastatin 10 MG Oral Tab Take 1 tablet (10 mg total) by mouth nightly.      Calcium Carbonate-Vit D-Min (CALTRATE 600+D PLUS MINERALS OR) Take by mouth daily.      Multiple Vitamin (MULTIVITAMIN ADULT OR) Take 1 tablet by mouth daily.      Nutritional Supplements (JUICE PLUS FIBRE OR) Take by mouth 2 (two) times a day.       alendronate 70 MG Oral Tab 1 tablet (70 mg total) once a week. On Sundays      Sertraline HCl (ZOLOFT) 100 MG Oral Tab Take 1 tablet (100 mg total) by mouth daily.      BuPROPion HCl (WELLBUTRIN XL) 150 MG Oral Tablet SR 24 Hr Take  by mouth daily.      FISH OIL CONCENTRATE 1000 MG OR CAPS 6 grams daily       Past Medical History:    allergic rhinitis    anemia    in past and egd and colon neg    Arthritis    Cancer (HCC)    basal cell cancer    Depression    Esophageal reflux    Fatigue    Food intolerance    gluten    gluten    High cholesterol    hypothyroidism    Irregular bowel habits    osteopenia    Osteoporosis    Sleep apnea    cpap     Past Surgical History:   Procedure Laterality Date    Cataract  04/2021    both eyes    Colonoscopy      2002 Dr. Mustafa    Colonoscopy  01/2010    due 2015    Other  2000    ductectomy rt breast    Other surgical history      laproscopy 82, cervical dysplasia with cone biopsy 82    Other surgical history Bilateral 2018    oopherectomy    Skin surgery  12/07/2016    MMS for BCC-infil to the L Nares-AB    Skin surgery      BCC to Left Superior Nasolabial Fold mms dr gasca     Social History     Socioeconomic History    Marital status:    Tobacco Use    Smoking status: Never    Smokeless tobacco: Never   Vaping Use    Vaping status: Never Used   Substance and Sexual Activity    Alcohol use: Yes     Alcohol/week: 2.0 standard drinks of alcohol     Types: 2 Glasses of wine per week    Drug use: No     Social Determinants of Health      [facility]    Social Connections    Received from [facility], [facility]    Housing Stability     Family History   Problem Relation Age of Onset    Diabetes Father     Hypertension Mother     Cancer Mother         melanoma    Cancer Brother         melanoma       REVIEW OF SYSTEMS: 10 point ROS completed, refer to HPI for pertinent  positives    Objective:   PHYSICAL EXAMINATION:  Vital Signs:   Vitals:    05/02/24 1053   BP: 114/82   Pulse: 79      General Appearance:  Alert, in no acute distress, well developed  Eyes:  normal conjunctivae, sclera  Ears/Nose/Mouth/Throat/Neck:  normal hearing, normal speech and no palpable thyroid nodules  Respiratory:  breathing comfortably on room air, clear to auscultation bilaterally  Cardiovascular:  regular rate and rhythm, no murmurs, S3 or S4, no peripheral edema  Psychiatric:  Oriented to person, place and time, appropriate mood & affect  Skin: Normal moisture and skin texture  Neuro: sensory grossly intact, motor grossly intact.      Recent Labs: Personally reviewed in EPIC under lab tab on 5/2/2024      Radiology:  Independently visualized on 5/2/2024  I reviewed the patient's records from outside facility which revealed: osteopenia    DXA Scans:  Date L2-L4 BMD T-score % change Mean Femoral Neck BMD T-score % change   12/21/2023 0.887 -1.5 0.698 -2   12/14/2022  -2.2  -1.7           ASSESSMENT/PLAN:  Gena Denis is a 73 year old female seen in consultation for:    1. Age-related osteoporosis without current pathological fracture  2. Vitamin D deficiency  - PTH, Intact [E]  - Renal Function Panel  - C-Telopeptide ([facility])  - Alkaline Phosphatase, Bone Specific  - MAGNESIUM [622][Q]  - CELIAC DISEASE SCREEN Reflex [E]; Future  - VITAMIN D, 25-HYDROXY [25616][Q]  Discussed pathophysiology of bone loss and clinical significance of DEXA scans with the patient.  The patient has confirmed osteopenia with low T-scores in the mean femoral neck. The patient's vertebral compression fracture history indicates poor bone quality and osteoporosis.  Explained the patient's risk factors for osteoporosis including age, family history, post-menopause  The patient is at high risk for future osteoporotic fractures if her osteoporosis remains untreated.  Recommended workup for secondary causes of  osteoporosis, including SPEP, PTH, celiac screen, Alk Phos levels, and vitamin D levels. Will discuss next steps once labs result.  Discussed the importance of adopting lifestyle measures, such as adequate calcium and vitamin D intake, exercise, smoking cessation, counseling on fall prevention, and avoidance of heavy alcohol use, to reduce bone loss in postmenopausal women.  Suggested 1200 mg of elemental calcium daily (total diet plus supplement) and 1000 IU of vitamin D daily as general recommendations. Will update recommendations once labs result.  Recommended pharmacologic therapy for postmenopausal women with established osteoporosis or fragility fracture. Patient has previously completed 3 years of fosamax.   Discussed available treatment options for osteoporosis, including bisphosphonates (oral vs. IV), anabolics, Evenity, and Prolia injections, as well as their indications, risks, and benefits, including black box warnings.  Discussed concerns about an increased risk of vertebral fracture after discontinuation of denosumab, the need for indefinite administration of denosumab    Will discuss next steps once secondary work up results  Recommended DXA every two years for patients starting on therapy, with additional evaluation for contributing factors if a clinically significant BMD decrease or new fracture occurs.       3. Hypothyroidism due to Hashimoto's thyroiditis  - TSH and Free T4 [E]    -Currently on levothyroxine 112 mcg daily  -Will repeat labs and adjust dose as needed  -Pt verbalized understanding on proper way to take LT4 and endorses compliance with medication     The above assessment and plan was discussed with the patient. The patient noted understanding and agreement with the plan listed above.      Visit Duration : A total of 60 minutes was spent today on obtaining history, reviewing pertinent labs and imaging ordered by other providers, evaluating patient, providing multiple treatment  options, reinforcing diet/exercise and compliance, and completing documentation.       Note to patient: The 21 Century Cures Act makes medical notes like these available to patients in the interest of transparency. However, be advised this is a medical document. It is intended as peer to peer communication. It is written in medical language and may contain abbreviations or verbiage that are unfamiliar. It may appear blunt or direct. Medical documents are intended to carry relevant information, facts as evident, and the clinical opinion of the practitioner      Alia Hernandez DO  Endocrinology, Diabetes & Metabolism   5/2/2024

## 2024-05-09 ENCOUNTER — LABORATORY ENCOUNTER (OUTPATIENT)
Dept: LAB | Age: 74
End: 2024-05-09
Attending: STUDENT IN AN ORGANIZED HEALTH CARE EDUCATION/TRAINING PROGRAM
Payer: MEDICARE

## 2024-05-09 DIAGNOSIS — M81.0 AGE-RELATED OSTEOPOROSIS WITHOUT CURRENT PATHOLOGICAL FRACTURE: ICD-10-CM

## 2024-05-09 LAB
ALBUMIN SERPL-MCNC: 3.7 G/DL (ref 3.4–5)
ANION GAP SERPL CALC-SCNC: 2 MMOL/L (ref 0–18)
BUN BLD-MCNC: 28 MG/DL (ref 9–23)
CALCIUM BLD-MCNC: 9.2 MG/DL (ref 8.5–10.1)
CHLORIDE SERPL-SCNC: 107 MMOL/L (ref 98–112)
CO2 SERPL-SCNC: 31 MMOL/L (ref 21–32)
CREAT BLD-MCNC: 0.98 MG/DL
EGFRCR SERPLBLD CKD-EPI 2021: 60 ML/MIN/1.73M2 (ref 60–?)
GLUCOSE BLD-MCNC: 91 MG/DL (ref 70–99)
IGA SERPL-MCNC: 229.5 MG/DL (ref 70–312)
MAGNESIUM SERPL-MCNC: 2.3 MG/DL (ref 1.6–2.6)
OSMOLALITY SERPL CALC.SUM OF ELEC: 295 MOSM/KG (ref 275–295)
PHOSPHATE SERPL-MCNC: 3.6 MG/DL (ref 2.5–4.9)
POTASSIUM SERPL-SCNC: 4.4 MMOL/L (ref 3.5–5.1)
PTH-INTACT SERPL-MCNC: 31.7 PG/ML (ref 18.5–88)
SODIUM SERPL-SCNC: 140 MMOL/L (ref 136–145)
T4 FREE SERPL-MCNC: 1 NG/DL (ref 0.8–1.7)
TSI SER-ACNC: 2.51 MIU/ML (ref 0.36–3.74)
VIT D+METAB SERPL-MCNC: 68 NG/ML (ref 30–100)

## 2024-05-09 PROCEDURE — 82784 ASSAY IGA/IGD/IGG/IGM EACH: CPT

## 2024-05-09 PROCEDURE — 86364 TISS TRNSGLTMNASE EA IG CLAS: CPT

## 2024-05-09 PROCEDURE — 82523 COLLAGEN CROSSLINKS: CPT | Performed by: STUDENT IN AN ORGANIZED HEALTH CARE EDUCATION/TRAINING PROGRAM

## 2024-05-09 PROCEDURE — 84443 ASSAY THYROID STIM HORMONE: CPT | Performed by: STUDENT IN AN ORGANIZED HEALTH CARE EDUCATION/TRAINING PROGRAM

## 2024-05-09 PROCEDURE — 84439 ASSAY OF FREE THYROXINE: CPT | Performed by: STUDENT IN AN ORGANIZED HEALTH CARE EDUCATION/TRAINING PROGRAM

## 2024-05-09 PROCEDURE — 36415 COLL VENOUS BLD VENIPUNCTURE: CPT | Performed by: STUDENT IN AN ORGANIZED HEALTH CARE EDUCATION/TRAINING PROGRAM

## 2024-05-09 PROCEDURE — 83970 ASSAY OF PARATHORMONE: CPT | Performed by: STUDENT IN AN ORGANIZED HEALTH CARE EDUCATION/TRAINING PROGRAM

## 2024-05-09 PROCEDURE — 83735 ASSAY OF MAGNESIUM: CPT | Performed by: STUDENT IN AN ORGANIZED HEALTH CARE EDUCATION/TRAINING PROGRAM

## 2024-05-09 PROCEDURE — 80069 RENAL FUNCTION PANEL: CPT | Performed by: STUDENT IN AN ORGANIZED HEALTH CARE EDUCATION/TRAINING PROGRAM

## 2024-05-09 PROCEDURE — 84080 ASSAY ALKALINE PHOSPHATASES: CPT

## 2024-05-09 PROCEDURE — 82306 VITAMIN D 25 HYDROXY: CPT | Performed by: STUDENT IN AN ORGANIZED HEALTH CARE EDUCATION/TRAINING PROGRAM

## 2024-05-10 ENCOUNTER — NURSE ONLY (OUTPATIENT)
Dept: INTERNAL MEDICINE CLINIC | Facility: CLINIC | Age: 74
End: 2024-05-10
Payer: MEDICARE

## 2024-05-10 DIAGNOSIS — E53.8 VITAMIN B12 DEFICIENCY: Primary | ICD-10-CM

## 2024-05-10 LAB — TTG IGA SER-ACNC: 0.4 U/ML (ref ?–7)

## 2024-05-10 PROCEDURE — 96372 THER/PROPH/DIAG INJ SC/IM: CPT | Performed by: INTERNAL MEDICINE

## 2024-05-10 RX ORDER — CYANOCOBALAMIN 1000 UG/ML
1000 INJECTION, SOLUTION INTRAMUSCULAR; SUBCUTANEOUS ONCE
Status: COMPLETED | OUTPATIENT
Start: 2024-05-10 | End: 2024-05-10

## 2024-05-10 RX ADMIN — CYANOCOBALAMIN 1000 MCG: 1000 INJECTION, SOLUTION INTRAMUSCULAR; SUBCUTANEOUS at 10:13:00

## 2024-05-11 LAB — ALKALINE PHOSPHATASE BONE SPECIFIC: 8.1 UG/L

## 2024-05-15 LAB — C-TELOPEPTIDE: 66 PG/ML

## 2024-05-31 RX ORDER — LEVOTHYROXINE SODIUM 112 UG/1
TABLET ORAL
Qty: 90 TABLET | Refills: 0 | OUTPATIENT
Start: 2024-05-31

## 2024-06-03 ENCOUNTER — OFFICE VISIT (OUTPATIENT)
Facility: CLINIC | Age: 74
End: 2024-06-03
Payer: MEDICARE

## 2024-06-03 VITALS
DIASTOLIC BLOOD PRESSURE: 58 MMHG | OXYGEN SATURATION: 95 % | WEIGHT: 158 LBS | HEIGHT: 64 IN | HEART RATE: 67 BPM | BODY MASS INDEX: 26.98 KG/M2 | SYSTOLIC BLOOD PRESSURE: 124 MMHG

## 2024-06-03 DIAGNOSIS — E03.8 HYPOTHYROIDISM DUE TO HASHIMOTO'S THYROIDITIS: Primary | ICD-10-CM

## 2024-06-03 DIAGNOSIS — M81.0 AGE-RELATED OSTEOPOROSIS WITHOUT CURRENT PATHOLOGICAL FRACTURE: ICD-10-CM

## 2024-06-03 DIAGNOSIS — E06.3 HYPOTHYROIDISM DUE TO HASHIMOTO'S THYROIDITIS: Primary | ICD-10-CM

## 2024-06-03 PROCEDURE — 99214 OFFICE O/P EST MOD 30 MIN: CPT | Performed by: STUDENT IN AN ORGANIZED HEALTH CARE EDUCATION/TRAINING PROGRAM

## 2024-06-03 RX ORDER — ALENDRONATE SODIUM 70 MG/1
70 TABLET ORAL WEEKLY
Qty: 13 TABLET | Refills: 1 | Status: SHIPPED | OUTPATIENT
Start: 2024-06-03 | End: 2024-12-02

## 2024-06-03 RX ORDER — LEVOTHYROXINE SODIUM 112 UG/1
112 TABLET ORAL
Qty: 90 TABLET | Refills: 1 | Status: SHIPPED | OUTPATIENT
Start: 2024-06-03

## 2024-06-03 NOTE — PATIENT INSTRUCTIONS
Return Visit   [ x ] Physician in 6 months   [  ] In person or video visit  [  ] In person only    [ x ] After visit summary   [ x ] Placed labs/imaging.    [ x ] Central scheduling # for ultrasound/nuclear med/CT/MRI/DXA     It was great seeing you today!    Today we discussed your osteoporosis:  -We reviewed your secondary evaluation and your bone turnover markers which were on the low end of the range  -Since your compression fracture was seen in 2023 but we are unsure when this happened from [id number], we reviewed what would qualify as \"fosamax failure\"  -Since you have not fractured outside of above episode, and since your bone markers are on the low end which shows your fosamax is absorbing, we reviewed options including repeat bone density 12/2024 with bone turnover markers at that time  -Once these labs and imaging results, we will discuss next steps (holiday vs. Continued therapy vs. Switch) in December     Take care!  -Dr. Hernandez

## 2024-06-03 NOTE — PROGRESS NOTES
ENDOCRINOLOGY, DIABETES & METABOLISM CONSULT NOTE   Initial Consult Date: 05/02/24  Name: Gena Denis   Referring Physician: No ref. provider found    Subjective:    HISTORY OF PRESENT ILLNESS:   Gena Denis is a 73 year old female seen in consultation for her osteoporosis    Patient presents to the clinic for an initial bone health evaluation due to a history of DEXA confirmed osteoporosis.     OSTEOPOROSIS RISK FACTORS ASSESSMENT  Postmenopausal Yes, age 51, was on HRT for 3 years   Maternal hx of osteoporosis or hx of parental hip fx:  Maternal grandmother passed away from bone cancer in 1963. She was diagnosed after a hip fracture.    Recent Fracture: Yes, compression fracture seen on CT last summer 2023 (unclear when this was from [id number])    Previous fracture after the age of 50:  No   Hx of kidney stones No   Frequent falls Yes, previously would fall twice a year but since she has been doing weight training and balance with professional  she notes improvement    Poor vision No   Decrease in height No   New or Worsening Back Pain NoIntermittent low back pain that resolves   History of Vit D insufficiency:   Current Vitamin D supplementation: No  Vitamin D 5000 units + K daily   Poor intake of calcium  Daily calcium intake: Yes, sensitivity to cows milk. Has 0.5 oz of goat cheese on toast   Calcium citrate 1200 mg total   Caffeine intake Yes, 36 oz of coffee daily   Smoking No   Alcohol intake >3/d:  No   Hx of thyroid disease Yes, hypothyroidism diagnosed around 50 + years ago   Hx of calcium problems or hyperparathyroidism No   Previous treatment for osteoporosis Yes, fosamax for the last 3 years   Malabsorption, chronic diarrhea, celiac sprue   No notes symptoms that could be consistent with celiac's   History of RA  No   History of skeletal radiation No   History of eating disorder No     Intake of medications that cause bone loss:  Long term steroid use: No  Aromatase inhibitor:  No  Seizure medications: No  GnRH agonist: No  PPI: No  Lithium: No  SSRI: No  Actos/Avandia: No    Treatment Contraindications:   Plans for dental procedures: around 3/2024; gets teeth cleaned every 3 months    6/3/2024  Continued on Fosamax, rarely missed doses. Maybe 1-2x/ year  Continues 5000 units of vitamin D daily  Continue calcium citrate 1200 mg daily  Denies falls or fractures since our last visit   Increased exerscise by 30 minutes weekly      Allergies, PMH, SocHx and FHx reviewed and updated as appropriate in Epic on   No outpatient medications have been marked as taking for the 6/3/24 encounter (Office Visit) with Alia Hernandez DO.     No Known Allergies  Current Outpatient Medications   Medication Sig Dispense Refill    Estradiol 10 MCG Vaginal Tab Place one tablet into the vagina at bedtime for two weeks and then three times weekly      Cholecalciferol 125 MCG (5000 UT) Oral Tab Take 1 tablet (5,000 Units total) by mouth daily.      levothyroxine 112 MCG Oral Tab Take 1 tablet (112 mcg total) by mouth before breakfast.      rosuvastatin 10 MG Oral Tab Take 1 tablet (10 mg total) by mouth nightly.      Calcium Carbonate-Vit D-Min (CALTRATE 600+D PLUS MINERALS OR) Take by mouth daily.      Multiple Vitamin (MULTIVITAMIN ADULT OR) Take 1 tablet by mouth daily.      Nutritional Supplements (JUICE PLUS FIBRE OR) Take by mouth 2 (two) times a day.      alendronate 70 MG Oral Tab 1 tablet (70 mg total) once a week. On Sundays      Sertraline HCl (ZOLOFT) 100 MG Oral Tab Take 1 tablet (100 mg total) by mouth daily.      BuPROPion HCl (WELLBUTRIN XL) 150 MG Oral Tablet SR 24 Hr Take  by mouth daily.      FISH OIL CONCENTRATE 1000 MG OR CAPS 6 grams daily       Past Medical History:    allergic rhinitis    anemia    in past and egd and colon neg    Arthritis    Cancer (HCC)    basal cell cancer    Depression    Esophageal reflux    Fatigue    Food intolerance    gluten    gluten    High cholesterol     hypothyroidism    Irregular bowel habits    osteopenia    Osteoporosis    Sleep apnea    cpap     Past Surgical History:   Procedure Laterality Date    Cataract  04/2021    both eyes    Colonoscopy      2002 Dr. Mustafa    Colonoscopy  01/2010    due 2015    Other  2000    ductectomy rt breast    Other surgical history      laproscopy 82, cervical dysplasia with cone biopsy 82    Other surgical history Bilateral 2018    oopherectomy    Skin surgery  12/07/2016    MMS for BCC-infil to the L Nares-AB    Skin surgery      BCC to Left Superior Nasolabial Fold mms dr gasca     Social History     Socioeconomic History    Marital status:    Tobacco Use    Smoking status: Never    Smokeless tobacco: Never   Vaping Use    Vaping status: Never Used   Substance and Sexual Activity    Alcohol use: Yes     Alcohol/week: 2.0 standard drinks of alcohol     Types: 2 Glasses of wine per week    Drug use: No     Social Determinants of Health      [facility]    Social Connections    [facility], [facility]    Housing Stability     Family History   Problem Relation Age of Onset    Diabetes Father     Hypertension Mother     Cancer Mother         melanoma    Cancer Brother         melanoma       REVIEW OF SYSTEMS: 10 point ROS completed, refer to HPI for pertinent positives    Objective:   PHYSICAL EXAMINATION:  Vital Signs:   Vitals:    06/03/24 1401   BP: 124/58   Pulse: 67      General Appearance:  Alert, in no acute distress, well developed  Eyes:  normal conjunctivae, sclera  Ears/Nose/Mouth/Throat/Neck:  normal hearing, normal speech and no palpable thyroid nodules  Respiratory:  breathing comfortably on room air, clear to auscultation bilaterally  Cardiovascular:  regular rate and rhythm, no murmurs, S3 or S4, no peripheral edema  Psychiatric:  Oriented to person, place and time, appropriate mood &  affect  Skin: Normal moisture and skin texture  Neuro: sensory grossly intact, motor grossly intact.      Recent Labs: Personally reviewed in [facility] under lab tab.    Radiology:  Independently visualized pertinent imaging.  I reviewed the patient's records from outside facility which revealed: osteopenia    DXA Scans:  Date L2-L4 BMD T-score % change Mean Femoral Neck BMD T-score % change   12/21/2023 0.887 -1.5 0.698 -2   12/14/2022  -2.2  -1.7           ASSESSMENT/PLAN:  Gena Denis is a 73 year old female seen in consultation for:    Age-related osteoporosis without current pathological fracture  - C-Telopeptide ([employer]); Future  - XR DEXA BONE DENSITOMETRY (CPT=77080); Future  - Alkaline Phosphatase, Bone Specific; Future  - Renal Function Panel; Future  - VITAMIN D, 25-HYDROXY [45907][Q]; Future  - C-Telopeptide ([facility])  - Alkaline Phosphatase, Bone Specific  - Renal Function Panel  - VITAMIN D, 25-HYDROXY [23164][Q]  - alendronate 70 MG Oral Tab; Take 1 tablet (70 mg total) by mouth once a week. On Sundays  Dispense: 13 tablet; Refill: 1   Discussed pathophysiology of bone loss and clinical significance of DEXA scans with the patient.  The patient has confirmed osteopenia with low T-scores in the mean femoral neck. The patient's vertebral compression fracture history indicates poor bone quality and osteoporosis.  Explained the patient's risk factors for osteoporosis including age, family history, post-menopause    Discussed the importance of adopting lifestyle measures, such as adequate calcium and vitamin D intake, exercise, smoking cessation, counseling on fall prevention, and avoidance of heavy alcohol use, to reduce bone loss in postmenopausal women.  Suggested 1200 mg of elemental calcium daily (total diet plus supplement) and 1000 IU of vitamin D daily as general recommendations.     Patient has previously completed 3 years of fosamax.   We reviewed redd secondary evaluation  and her bone turnover markers 5/2024 which were on the low end of the range  Since her compression fracture was seen in 2023 but we are unsure when this happened from [id number] so it is difficult to say if this is bisphosphonate failure  Since she has not fractured outside of above episode, and since her bone markers are on the low end which shows her fosamax is likely absorbing, we reviewed options including repeat bone density 12/2024 with bone turnover markers at that time  Once these labs and imaging results, we will discuss next steps (holiday vs. Continued therapy vs. Switch) in December 2024  Patient verbalized understanding and is in agreement with above plan    Recommended DXA every two years for patients starting on therapy, with additional evaluation for contributing factors if a clinically significant BMD decrease or new fracture occurs.      Hypothyroidism due to Hashimoto's thyroiditis  - levothyroxine 112 MCG Oral Tab; Take 1 tablet (112 mcg total) by mouth before breakfast.  Dispense: 90 tablet; Refill: 1  - TSH and Free T4 [E]; Future  - TSH and Free T4 [E]  -Currently on levothyroxine 112 mcg daily, TFTs WNL 5/2024  -Will repeat labs prior to follow up visit   -Pt verbalized understanding on proper way to take LT4 and endorses compliance with medication     The above assessment and plan was discussed with the patient. The patient noted understanding and agreement with the plan listed above.       Note to patient: The 21 Century Cures Act makes medical notes like these available to patients in the interest of transparency. However, be advised this is a medical document. It is intended as peer to peer communication. It is written in medical language and may contain abbreviations or verbiage that are unfamiliar. It may appear blunt or direct. Medical documents are intended to carry relevant information, facts as evident, and the clinical opinion of the practitioner      Alia Hernandez  DO  Endocrinology, Diabetes & Metabolism   6/3/2024

## 2024-06-04 RX ORDER — LEVOTHYROXINE SODIUM 112 UG/1
112 TABLET ORAL
Refills: 0 | OUTPATIENT
Start: 2024-06-04

## 2024-06-10 ENCOUNTER — NURSE ONLY (OUTPATIENT)
Dept: INTERNAL MEDICINE CLINIC | Facility: CLINIC | Age: 74
End: 2024-06-10
Payer: MEDICARE

## 2024-06-10 DIAGNOSIS — E53.8 VITAMIN B12 DEFICIENCY: Primary | ICD-10-CM

## 2024-06-10 PROCEDURE — 96372 THER/PROPH/DIAG INJ SC/IM: CPT | Performed by: INTERNAL MEDICINE

## 2024-06-10 RX ORDER — CYANOCOBALAMIN 1000 UG/ML
1000 INJECTION, SOLUTION INTRAMUSCULAR; SUBCUTANEOUS ONCE
Status: COMPLETED | OUTPATIENT
Start: 2024-06-10 | End: 2024-06-10

## 2024-06-10 RX ADMIN — CYANOCOBALAMIN 1000 MCG: 1000 INJECTION, SOLUTION INTRAMUSCULAR; SUBCUTANEOUS at 11:22:00

## 2024-06-16 ENCOUNTER — OFFICE VISIT (OUTPATIENT)
Dept: FAMILY MEDICINE CLINIC | Facility: CLINIC | Age: 74
End: 2024-06-16
Payer: MEDICARE

## 2024-06-16 VITALS
HEART RATE: 68 BPM | SYSTOLIC BLOOD PRESSURE: 136 MMHG | BODY MASS INDEX: 27.31 KG/M2 | WEIGHT: 160 LBS | DIASTOLIC BLOOD PRESSURE: 80 MMHG | OXYGEN SATURATION: 97 % | TEMPERATURE: 98 F | RESPIRATION RATE: 18 BRPM | HEIGHT: 64 IN

## 2024-06-16 DIAGNOSIS — J02.9 SORE THROAT: Primary | ICD-10-CM

## 2024-06-16 DIAGNOSIS — R50.9 FEVER, UNSPECIFIED FEVER CAUSE: ICD-10-CM

## 2024-06-16 LAB
CONTROL LINE PRESENT WITH A CLEAR BACKGROUND (YES/NO): YES YES/NO
KIT LOT #: NORMAL NUMERIC

## 2024-06-16 PROCEDURE — 87635 SARS-COV-2 COVID-19 AMP PRB: CPT | Performed by: FAMILY MEDICINE

## 2024-06-16 PROCEDURE — 87081 CULTURE SCREEN ONLY: CPT | Performed by: FAMILY MEDICINE

## 2024-06-16 PROCEDURE — 87880 STREP A ASSAY W/OPTIC: CPT | Performed by: FAMILY MEDICINE

## 2024-06-16 PROCEDURE — 99213 OFFICE O/P EST LOW 20 MIN: CPT | Performed by: FAMILY MEDICINE

## 2024-06-16 RX ORDER — CEPHALEXIN 500 MG/1
500 CAPSULE ORAL 2 TIMES DAILY
Qty: 20 CAPSULE | Refills: 0 | Status: SHIPPED | OUTPATIENT
Start: 2024-06-16 | End: 2024-06-26

## 2024-06-16 NOTE — PROGRESS NOTES
CHIEF COMPLAINT:     Chief Complaint   Patient presents with    Sore Throat     4 days, body aches, tmax 99.5, OTC tylenol          HPI:   Gena Denis is a 73 year old female presents to clinic with complaint of sore throat. Patient has had for 4 days. Patient reports body aches, low-grade fever. Denies congestion, cough, headache, nausea, rash. Has remote history of strep throat. No one is sick at home.  Treating symptoms with tylenol.   Pt states sore throat severe and more significant than prior sore throats due to viral illnesses.    Current Outpatient Medications   Medication Sig Dispense Refill    cephalexin (KEFLEX) 500 MG Oral Cap Take 1 capsule (500 mg total) by mouth 2 (two) times daily for 10 days. 20 capsule 0    levothyroxine 112 MCG Oral Tab Take 1 tablet (112 mcg total) by mouth before breakfast. 90 tablet 1    alendronate 70 MG Oral Tab Take 1 tablet (70 mg total) by mouth once a week. On Sundays 13 tablet 1    Estradiol 10 MCG Vaginal Tab Place one tablet into the vagina at bedtime for two weeks and then three times weekly      Cholecalciferol 125 MCG (5000 UT) Oral Tab Take 1 tablet (5,000 Units total) by mouth daily.      rosuvastatin 10 MG Oral Tab Take 1 tablet (10 mg total) by mouth nightly.      Calcium Carbonate-Vit D-Min (CALTRATE 600+D PLUS MINERALS OR) Take by mouth daily.      Multiple Vitamin (MULTIVITAMIN ADULT OR) Take 1 tablet by mouth daily.      Sertraline HCl (ZOLOFT) 100 MG Oral Tab Take 1 tablet (100 mg total) by mouth daily.      BuPROPion HCl (WELLBUTRIN XL) 150 MG Oral Tablet SR 24 Hr Take  by mouth daily.      FISH OIL CONCENTRATE 1000 MG OR CAPS 6 grams daily      Nutritional Supplements (JUICE PLUS FIBRE OR) Take by mouth 2 (two) times a day.       No current facility-administered medications for this visit.      Past Medical History:    allergic rhinitis    anemia    in past and egd and colon neg    Arthritis    Cancer (HCC)    basal cell cancer    Depression     Esophageal reflux    Fatigue    Food intolerance    gluten    gluten    High cholesterol    hypothyroidism    Irregular bowel habits    osteopenia    Osteoporosis    Sleep apnea    cpap      Social History:  Social History     Socioeconomic History    Marital status:    Tobacco Use    Smoking status: Never    Smokeless tobacco: Never   Vaping Use    Vaping status: Never Used   Substance and Sexual Activity    Alcohol use: Yes     Alcohol/week: 2.0 standard drinks of alcohol     Types: 2 Glasses of wine per week    Drug use: No     Social Determinants of Health      [facility] from [facility], [facility]    Social Connections    Received from [facility], [facility]    Housing Stability        REVIEW OF SYSTEMS:   GENERAL HEALTH: feels well otherwise, normal appetite  SKIN: denies any unusual skin lesions or rashes  HEENT: denies ear pain, See HPI  RESPIRATORY: denies shortness of breath or wheezing  CARDIOVASCULAR: denies chest pain or palpitations   GI: denies vomiting or diarrhea  NEURO: denies dizziness or lightheadedness    EXAM:   /80   Pulse 68   Temp 98.3 °F (36.8 °C)   Resp 18   Ht 5' 4\" (1.626 m)   Wt 160 lb (72.6 kg)   SpO2 97%   BMI 27.46 kg/m²   GENERAL: well developed, well nourished,in no apparent distress  SKIN: no rashes,no suspicious lesions  HEAD: atraumatic, normocephalic  EYES: conjunctivae clear, EOM intact  EARS: TM's clear, non-injected, no bulging, retraction, or fluid bilaterally  NOSE: nostrils patent, no exudates, nasal mucosa pink and noninflamed  THROAT: oral mucosa pink, moist. Posterior pharynx with beefy erythema + b/l exudates. Tonsils 3/4.  Breath not malodorous   NECK: supple, non-tender  LUNGS: clear to auscultation bilaterally, no wheezes or rhonchi. Breathing is non labored.  CARDIO: RRR without murmur  EXTREMITIES: no cyanosis, clubbing or edema  LYMPH: + anterior cervical and  submandibular lymphadenopathy.  No posterior cervical or occipital lymphadenopathy.    Recent Results (from the past 24 hour(s))   Strep A Assay W/Optic    Collection Time: 06/16/24  2:10 PM   Result Value Ref Range    Strep Grp A Screen neg Negative    Control Line Present with a clear background (yes/no) yes Yes/No    Kit Lot # [id number] Numeric    Kit Expiration Date 5/21/25 Date         ASSESSMENT AND PLAN:     Encounter Diagnoses   Name Primary?    Sore throat Yes    Fever, unspecified fever cause        Orders Placed This Encounter   Procedures    Strep A Assay W/Optic    [facility] COLLECT Alinity Covid-19 by PCR ([facility] USE ONLY)    Grp A Strep Cult, Throat       Meds & Refills for this Visit:  Requested Prescriptions     Signed Prescriptions Disp Refills    cephalexin (KEFLEX) 500 MG Oral Cap 20 capsule 0     Sig: Take 1 capsule (500 mg total) by mouth 2 (two) times daily for 10 days.       Imaging & Consults:  None.    Comfort measures explained and discussed as listed in Patient Instructions    Follow up in 3-5 days if not improving, condition worsens, or fever greater than or equal to 100.4 persists for 72 hours.      Patient Instructions   Take antibiotics with food and plenty of water.   Eat yogurt or take probiotic daily. (Align is a good example of an OTC probiotic)  Make sure to finish the entire antibiotic treatment.  Increase fluids and rest.   Use otc meds as needed.      Your covid test will be back in 24-36 hours.   Your strep culture will be back in 72 hours.     Monitor symptoms and contact the office if no better in 2-3 days.      The patient/parent indicates understanding of these issues and agrees to the plan.  The patient is asked to follow up with their PCP as needed.

## 2024-06-16 NOTE — PATIENT INSTRUCTIONS
Take antibiotics with food and plenty of water.   Eat yogurt or take probiotic daily. (Align is a good example of an OTC probiotic)  Make sure to finish the entire antibiotic treatment.  Increase fluids and rest.   Use otc meds as needed.      Your covid test will be back in 24-36 hours.   Your strep culture will be back in 72 hours.     Monitor symptoms and contact the office if no better in 2-3 days.

## 2024-06-17 LAB — SARS-COV-2 RNA RESP QL NAA+PROBE: NOT DETECTED

## 2024-07-12 ENCOUNTER — NURSE ONLY (OUTPATIENT)
Dept: INTERNAL MEDICINE CLINIC | Facility: CLINIC | Age: 74
End: 2024-07-12
Payer: MEDICARE

## 2024-07-12 DIAGNOSIS — E53.8 VITAMIN B12 DEFICIENCY: Primary | ICD-10-CM

## 2024-07-12 PROCEDURE — 96372 THER/PROPH/DIAG INJ SC/IM: CPT | Performed by: INTERNAL MEDICINE

## 2024-07-12 RX ORDER — CYANOCOBALAMIN 1000 UG/ML
1000 INJECTION, SOLUTION INTRAMUSCULAR; SUBCUTANEOUS ONCE
Status: COMPLETED | OUTPATIENT
Start: 2024-07-12 | End: 2024-07-12

## 2024-07-12 RX ADMIN — CYANOCOBALAMIN 1000 MCG: 1000 INJECTION, SOLUTION INTRAMUSCULAR; SUBCUTANEOUS at 09:53:00

## 2024-08-12 ENCOUNTER — NURSE ONLY (OUTPATIENT)
Dept: INTERNAL MEDICINE CLINIC | Facility: CLINIC | Age: 74
End: 2024-08-12
Payer: MEDICARE

## 2024-08-12 DIAGNOSIS — E53.8 B12 DEFICIENCY: Primary | ICD-10-CM

## 2024-08-12 RX ORDER — CYANOCOBALAMIN 1000 UG/ML
1000 INJECTION, SOLUTION INTRAMUSCULAR; SUBCUTANEOUS ONCE
Status: COMPLETED | OUTPATIENT
Start: 2024-08-12 | End: 2024-08-12

## 2024-08-12 RX ADMIN — CYANOCOBALAMIN 1000 MCG: 1000 INJECTION, SOLUTION INTRAMUSCULAR; SUBCUTANEOUS at 11:08:00

## 2024-08-26 ENCOUNTER — NURSE TRIAGE (OUTPATIENT)
Dept: INTERNAL MEDICINE CLINIC | Facility: CLINIC | Age: 74
End: 2024-08-26

## 2024-08-26 ENCOUNTER — OFFICE VISIT (OUTPATIENT)
Dept: INTERNAL MEDICINE CLINIC | Facility: CLINIC | Age: 74
End: 2024-08-26
Payer: MEDICARE

## 2024-08-26 VITALS
SYSTOLIC BLOOD PRESSURE: 138 MMHG | HEIGHT: 64.17 IN | RESPIRATION RATE: 18 BRPM | OXYGEN SATURATION: 99 % | BODY MASS INDEX: 27.38 KG/M2 | WEIGHT: 160.38 LBS | DIASTOLIC BLOOD PRESSURE: 76 MMHG | HEART RATE: 72 BPM | TEMPERATURE: 97 F

## 2024-08-26 DIAGNOSIS — M54.50 ACUTE LEFT-SIDED LOW BACK PAIN WITHOUT SCIATICA: Primary | ICD-10-CM

## 2024-08-26 DIAGNOSIS — E06.3 HYPOTHYROIDISM DUE TO HASHIMOTO'S THYROIDITIS: ICD-10-CM

## 2024-08-26 RX ORDER — METHYLPREDNISOLONE 4 MG
TABLET, DOSE PACK ORAL
Qty: 1 EACH | Refills: 0 | Status: SHIPPED | OUTPATIENT
Start: 2024-08-26

## 2024-08-26 RX ORDER — CYCLOBENZAPRINE HCL 5 MG
5 TABLET ORAL NIGHTLY PRN
Qty: 30 TABLET | Refills: 0 | Status: SHIPPED | OUTPATIENT
Start: 2024-08-26

## 2024-08-26 RX ORDER — LEVOTHYROXINE SODIUM 112 UG/1
112 TABLET ORAL
Qty: 90 TABLET | Refills: 0 | Status: SHIPPED | OUTPATIENT
Start: 2024-08-26

## 2024-08-26 RX ORDER — LEVOTHYROXINE SODIUM 112 UG/1
112 TABLET ORAL
Qty: 90 TABLET | Refills: 1 | Status: CANCELLED | OUTPATIENT
Start: 2024-08-26

## 2024-08-26 NOTE — TELEPHONE ENCOUNTER
Action Requested: Summary for Provider     []  Critical Lab, Recommendations Needed  [] Need Additional Advice  []   FYI    []   Need Orders  [] Need Medications Sent to Pharmacy  []  Other     SUMMARY: Patient reports lower back pain that started 2 weeks ago after moving, lifting and carrying a lot of boxes. Pain is getting worse, intermittent with activity like twisting and bending, feels like muscle spasms, occasionally intense. Decreases at rest. Years ago (40 years) had similar back pain but not this bad. Took a couple Cyclobenzaprine which helped, using tylenol arthritis, ice and stretching.   Appointment scheduled for this afternoon.    Reason for call: Back Pain  Onset: 2 weeks        Reason for Disposition   Age > 50 and no history of prior similar back pain    Protocols used: Back Pain-A-[place]

## 2024-08-26 NOTE — PROGRESS NOTES
Gena Denis is a 74 year old female.    Chief Complaint   Patient presents with    Low Back Pain     ES rm - 10 - INTMT sharpt low back pain for 2 wks at a 7/10       HPI:   here for evaluation of back pain.  2 week duration.  Since the end of June noted nagging back discomfort.  She proceeded to move some furniture on Aug 15 with sudden onset of low back pain.  Left low back without radiation. Has had pain before but many years and not this intense.   Taking tylenol without much help and cyclobenzaprine which did seem to help some.    No motor weakness  no numbness or tingling.   No change in bowel or bladder.      Hypothyroid  managed by Dr Hernandez.  Levothyroxine.  Last TSH stable     Patient Active Problem List   Diagnosis    Other specified hypothyroidism    Dyslipidemia    Benign paroxysmal vertigo    Personal history of other malignant neoplasm of skin    Vitamin B12 deficiency    Age-related osteoporosis without current pathological fracture    YESI (obstructive sleep apnea)    Depression    Vitamin D deficiency    Family history of heart disease    Bronchiectasis without complication (HCC)    Agatston CAC score, <100     Current Outpatient Medications   Medication Sig Dispense Refill    methylPREDNISolone (MEDROL) 4 MG Oral Tablet Therapy Pack As directed. 1 each 0    cyclobenzaprine 5 MG Oral Tab Take 1 tablet (5 mg total) by mouth nightly as needed for Muscle spasms. 30 tablet 0    levothyroxine 112 MCG Oral Tab Take 1 tablet (112 mcg total) by mouth before breakfast. 90 tablet 0    alendronate 70 MG Oral Tab Take 1 tablet (70 mg total) by mouth once a week. On Sundays 13 tablet 1    Estradiol 10 MCG Vaginal Tab Place one tablet into the vagina at bedtime for two weeks and then three times weekly      Cholecalciferol 125 MCG (5000 UT) Oral Tab Take 1 tablet (5,000 Units total) by mouth daily.      rosuvastatin 10 MG Oral Tab Take 1 tablet (10 mg total) by mouth nightly.      Calcium Carbonate-Vit  D-Min (CALTRATE 600+D PLUS MINERALS OR) Take by mouth daily.      Multiple Vitamin (MULTIVITAMIN ADULT OR) Take 1 tablet by mouth daily.      Nutritional Supplements (JUICE PLUS FIBRE OR) Take by mouth 2 (two) times a day.      Sertraline HCl (ZOLOFT) 100 MG Oral Tab Take 1 tablet (100 mg total) by mouth daily.      BuPROPion HCl (WELLBUTRIN XL) 150 MG Oral Tablet SR 24 Hr Take  by mouth daily.      FISH OIL CONCENTRATE 1000 MG OR CAPS 6 grams daily        Past Medical History:    allergic rhinitis    anemia    in past and egd and colon neg    Arthritis    Cancer (HCC)    basal cell cancer    Depression    Esophageal reflux    Fatigue    Food intolerance    gluten    gluten    High cholesterol    hypothyroidism    Irregular bowel habits    osteopenia    Osteoporosis    Sleep apnea    cpap      Social History:  Social History     Socioeconomic History    Marital status:    Tobacco Use    Smoking status: Never    Smokeless tobacco: Never   Vaping Use    Vaping status: Never Used   Substance and Sexual Activity    Alcohol use: Yes     Alcohol/week: 2.0 standard drinks of alcohol     Types: 2 Glasses of wine per week    Drug use: No     Social Determinants of Health      [facility]    Social Connections    [facility]    Housing Stability     Family History   Problem Relation Age of Onset    Diabetes Father     Hypertension Mother     Cancer Mother         melanoma    Cancer Brother         melanoma        Allergies  No Known Allergies    REVIEW OF SYSTEMS:   GENERAL HEALTH: feels well otherwise    MUSCULOSKELETAL:  as above  NEURO: denies headaches,     EXAM:   /76 (BP Location: Right arm, Patient Position: Sitting, Cuff Size: large)   Pulse 72   Temp 96.8 °F (36 °C) (Temporal)   Resp 18   Ht 5' 4.17\" (1.63 m)   Wt 160 lb 6.4 oz (72.8 kg)   SpO2 99%   BMI 27.38 kg/m²    GENERAL: well developed, well nourished,in no apparent distress  GI: normal bowel sounds, no masses, HSM or tenderness  EXTREMITIES: no edema, normal strength and tone  MS  point tenderness left lower back.  Normal straight leg raise.  Normal but hesitant ROM  PSYCH: alert and oriented x 3    ASSESSMENT AND PLAN:     Encounter Diagnoses   Name Primary?    Acute left-sided low back pain without sciatica  medrol dose pack with PRN flexeril.  Warned pt about sedation with this medication and advised pt about necessary precautions and activities to avoid.  Will consider PT if not improving.  She does work with a  a few days a week.  She will rest for now and then resume when pain has resolved.   Yes    Hypothyroidism due to Hashimoto's thyroiditis        No orders of the defined types were placed in this encounter.      Meds & Refills for this Visit:  Requested Prescriptions     Signed Prescriptions Disp Refills    methylPREDNISolone (MEDROL) 4 MG Oral Tablet Therapy Pack 1 each 0     Sig: As directed.    cyclobenzaprine 5 MG Oral Tab 30 tablet 0     Sig: Take 1 tablet (5 mg total) by mouth nightly as needed for Muscle spasms.    levothyroxine 112 MCG Oral Tab 90 tablet 0     Sig: Take 1 tablet (112 mcg total) by mouth before breakfast.       Imaging & Consults:  None    No follow-ups on file.  There are no Patient Instructions on file for this visit.

## 2024-09-09 ENCOUNTER — TELEPHONE (OUTPATIENT)
Dept: INTERNAL MEDICINE CLINIC | Facility: CLINIC | Age: 74
End: 2024-09-09

## 2024-09-09 NOTE — TELEPHONE ENCOUNTER
Called patient with recommendation, she confirms understanding. Appointment scheduled for Thursday.

## 2024-09-09 NOTE — TELEPHONE ENCOUNTER
Patient was seen on 8/26 for back pain. She states the medication did not help at all. She had a muscle spasm two days ago that made her scream. The pain now radiates down her outside right calf. She is scheduled to start PT in two days, but wonders if an MRI can be ordered for her. She is anxious to start PT without knowing what is wrong.

## 2024-09-09 NOTE — TELEPHONE ENCOUNTER
Recommend re evaluation as the pain was not radiating at time of last ov.  This will justify MRI to be completed.

## 2024-09-12 ENCOUNTER — OFFICE VISIT (OUTPATIENT)
Dept: INTERNAL MEDICINE CLINIC | Facility: CLINIC | Age: 74
End: 2024-09-12
Payer: MEDICARE

## 2024-09-12 VITALS
RESPIRATION RATE: 18 BRPM | HEIGHT: 65.35 IN | HEART RATE: 76 BPM | SYSTOLIC BLOOD PRESSURE: 116 MMHG | DIASTOLIC BLOOD PRESSURE: 80 MMHG | BODY MASS INDEX: 26.07 KG/M2 | WEIGHT: 158.38 LBS | TEMPERATURE: 97 F | OXYGEN SATURATION: 96 %

## 2024-09-12 DIAGNOSIS — E53.8 B12 DEFICIENCY: ICD-10-CM

## 2024-09-12 DIAGNOSIS — M54.16 LUMBAR RADICULOPATHY: Primary | ICD-10-CM

## 2024-09-12 PROCEDURE — 99214 OFFICE O/P EST MOD 30 MIN: CPT | Performed by: NURSE PRACTITIONER

## 2024-09-12 PROCEDURE — 96372 THER/PROPH/DIAG INJ SC/IM: CPT | Performed by: NURSE PRACTITIONER

## 2024-09-12 RX ORDER — METHYLPREDNISOLONE 4 MG
TABLET, DOSE PACK ORAL
Qty: 1 EACH | Refills: 0 | Status: SHIPPED | OUTPATIENT
Start: 2024-09-12

## 2024-09-12 RX ORDER — CYANOCOBALAMIN 1000 UG/ML
1000 INJECTION, SOLUTION INTRAMUSCULAR; SUBCUTANEOUS ONCE
Status: COMPLETED | OUTPATIENT
Start: 2024-09-12 | End: 2024-09-12

## 2024-09-12 RX ADMIN — CYANOCOBALAMIN 1000 MCG: 1000 INJECTION, SOLUTION INTRAMUSCULAR; SUBCUTANEOUS at 11:35:00

## 2024-09-12 NOTE — PROGRESS NOTES
Gena Denis is a 74 year old female.    Chief Complaint   Patient presents with    Back Pain     ES rm - 10 - INTMT sharp low back pain for 1 mo at a 7/10 with movement.    Leg Pain     INTMT dull L leg pain for 1 wk at a 5/10.  No swelling.       HPI:   Here for eval of back pain acute on chronic.   No fall or trauma although she did have heavy lifting mid August which seemed to have exacerbated the chronic pain.  She has been seen end of August treated with medrol dose pack but no improvement.  PT ordered. She started yesterday. Please see last ov note for reference.    She has also been doing acupunture  not helping much.  Saw a chiropractor who recommended MRI.    Now with radiation of pain down left lateral left leg.  Has not noted weakness but does show 4/5 LE weakness today on exam.  Taking ibuprofen 400mg qid which does seem to be helping maintain the pain.     Patient Active Problem List   Diagnosis    Other specified hypothyroidism    Dyslipidemia    Benign paroxysmal vertigo    Personal history of other malignant neoplasm of skin    Vitamin B12 deficiency    Age-related osteoporosis without current pathological fracture    YESI (obstructive sleep apnea)    Depression    Vitamin D deficiency    Family history of heart disease    Bronchiectasis without complication (HCC)    Agatston CAC score, <100     Current Outpatient Medications   Medication Sig Dispense Refill    methylPREDNISolone (MEDROL) 4 MG Oral Tablet Therapy Pack As directed. 1 each 0    cyclobenzaprine 5 MG Oral Tab Take 1 tablet (5 mg total) by mouth nightly as needed for Muscle spasms. 30 tablet 0    levothyroxine 112 MCG Oral Tab Take 1 tablet (112 mcg total) by mouth before breakfast. 90 tablet 0    alendronate 70 MG Oral Tab Take 1 tablet (70 mg total) by mouth once a week. On Sundays 13 tablet 1    Estradiol 10 MCG Vaginal Tab Place one tablet into the vagina at bedtime for two weeks and then three times weekly      Cholecalciferol  125 MCG (5000 UT) Oral Tab Take 1 tablet (5,000 Units total) by mouth daily.      rosuvastatin 10 MG Oral Tab Take 1 tablet (10 mg total) by mouth nightly.      Calcium Carbonate-Vit D-Min (CALTRATE 600+D PLUS MINERALS OR) Take by mouth daily.      Multiple Vitamin (MULTIVITAMIN ADULT OR) Take 1 tablet by mouth daily.      Nutritional Supplements (JUICE PLUS FIBRE OR) Take by mouth 2 (two) times a day.      Sertraline HCl (ZOLOFT) 100 MG Oral Tab Take 1 tablet (100 mg total) by mouth daily.      BuPROPion HCl (WELLBUTRIN XL) 150 MG Oral Tablet SR 24 Hr Take  by mouth daily.      FISH OIL CONCENTRATE 1000 MG OR CAPS 6 grams daily        Past Medical History:    allergic rhinitis    anemia    in past and egd and colon neg    Arthritis    Cancer (HCC)    basal cell cancer    Depression    Esophageal reflux    Fatigue    Food intolerance    gluten    gluten    High cholesterol    hypothyroidism    Irregular bowel habits    osteopenia    Osteoporosis    Sleep apnea    cpap      Social History:  Social History     Socioeconomic History    Marital status:    Tobacco Use    Smoking status: Never    Smokeless tobacco: Never   Vaping Use    Vaping status: Never Used   Substance and Sexual Activity    Alcohol use: Yes     Alcohol/week: 2.0 standard drinks of alcohol     Types: 2 Glasses of wine per week    Drug use: No     Social Determinants of Health      [facility]    Social Connections    [facility]    Housing Stability     Family History   Problem Relation Age of Onset    Diabetes Father     Hypertension Mother     Cancer Mother         melanoma    Cancer Brother         melanoma        Allergies  No Known Allergies    REVIEW OF SYSTEMS:   GENERAL HEALTH: as above   MUSCULOSKELETAL:  as above.   NEURO: denies headaches,     EXAM:   /80 (BP Location: Left arm, Patient Position:  Sitting, Cuff Size: large)   Pulse 76   Temp 97.2 °F (36.2 °C) (Temporal)   Resp 18   Ht 5' 5.35\" (1.66 m)   Wt 158 lb 6.4 oz (71.8 kg)   SpO2 96%   BMI 26.07 kg/m²   GENERAL: well developed, well nourished,in no apparent distress  GI: normal bowel sounds, no masses, HSM or tenderness  EXTREMITIES: no edema, normal strength and tone  MS:  normal straight leg raise with reproducible pain and radiation with contralateral leg raise of right.  4/5 extention   PSYCH: alert and oriented x 3    ASSESSMENT AND PLAN:     Encounter Diagnoses   Name Primary?    Lumbar radiculopathy  repeat medrol dose pack vs continuing ibuprofen.  Not both   MRI lumbar spine and given information for Dr Henning.  Further plan to follow imaging.  Can continue w PT as tolerated.  Avoid heavy lifting.  Encouraged her to rest.   Yes    B12 deficiency      Code selection for this visit was based on time spent (>30min) on date of service in preparing to see the patient, obtaining and/or reviewing separately obtained history, performing a medically appropriate examination, counseling and educating the patient/family/caregiver, ordering medications or testing, referring and communicating with other healthcare providers, documenting clinical information in the EHR, independently interpreting results and communicating results to the patient/family/caregiver and care coordination with the patient's other providers.    No orders of the defined types were placed in this encounter.      Meds & Refills for this Visit:  Requested Prescriptions     Signed Prescriptions Disp Refills    methylPREDNISolone (MEDROL) 4 MG Oral Tablet Therapy Pack 1 each 0     Sig: As directed.       Imaging & Consults:  MRI SPINE LUMBAR (CPT=72148)    No follow-ups on file.  There are no Patient Instructions on file for this visit.

## 2024-09-19 ENCOUNTER — MED REC SCAN ONLY (OUTPATIENT)
Dept: INTERNAL MEDICINE CLINIC | Facility: CLINIC | Age: 74
End: 2024-09-19

## 2024-09-19 NOTE — PROGRESS NOTES
[facility] approval letter MRI Procedure 59001. Placed in MP bin for review.

## 2024-09-25 ENCOUNTER — HOSPITAL ENCOUNTER (OUTPATIENT)
Dept: MRI IMAGING | Age: 74
Discharge: HOME | End: 2024-09-25
Attending: NURSE PRACTITIONER
Payer: MEDICARE

## 2024-09-25 DIAGNOSIS — M54.16 LUMBAR RADICULOPATHY: ICD-10-CM

## 2024-09-25 PROCEDURE — 72148 MRI LUMBAR SPINE W/O DYE: CPT | Performed by: NURSE PRACTITIONER

## 2024-09-26 PROBLEM — N28.1 RENAL CYST: Status: ACTIVE | Noted: 2024-09-26

## 2024-10-11 ENCOUNTER — MED REC SCAN ONLY (OUTPATIENT)
Dept: INTERNAL MEDICINE CLINIC | Facility: CLINIC | Age: 74
End: 2024-10-11

## 2024-11-05 ENCOUNTER — TELEPHONE (OUTPATIENT)
Dept: INTERNAL MEDICINE CLINIC | Facility: CLINIC | Age: 74
End: 2024-11-05

## 2024-11-05 DIAGNOSIS — E53.8 B12 DEFICIENCY: Primary | ICD-10-CM

## 2024-11-05 RX ORDER — CYANOCOBALAMIN 1000 UG/ML
1000 INJECTION, SOLUTION INTRAMUSCULAR; SUBCUTANEOUS ONCE
Status: DISCONTINUED | OUTPATIENT
Start: 2024-11-06 | End: 2024-11-05

## 2024-11-05 RX ORDER — CYANOCOBALAMIN 1000 UG/ML
1000 INJECTION, SOLUTION INTRAMUSCULAR; SUBCUTANEOUS ONCE
Status: COMPLETED | OUTPATIENT
Start: 2024-11-06 | End: 2024-11-06

## 2024-11-05 NOTE — TELEPHONE ENCOUNTER
Ok for injection tomorrow and can do monthly injections but I recommend repeating a level as well.

## 2024-11-05 NOTE — TELEPHONE ENCOUNTER
Per [facility] 3/10/24     Ra Kenny, DO to [facility] Clinical Staff     3/12/24 12:09 AM  Note  Yes, thank you! Let's repeat level after 3 months.          Last B12 injection was 9/12/24   Last B12 lab 2/13/24     Called and spoke w/ pt. Notified I have not seen a repeat lab of B12 that was advised to be done in 3 months. Pt stated she forgot she was to do that. Pt stated with her deficiency she usually goes by how she feels vs the lab value. She is surprised she has made it this long as last injection was September.     MP - Ok for B12 injection tomorrow? Do you want to provide updated recommendations regarding B12. Cont monthky? If so how long? Does she need lab done still? No lab order

## 2024-11-05 NOTE — TELEPHONE ENCOUNTER
Patient scheduled on below for b12 shot.     Future Appointments   Date Time Provider Department Center   11/6/2024  9:45 AM [facility] NURSE [facility]

## 2024-11-05 NOTE — TELEPHONE ENCOUNTER
B12 lab and B12 injection orders placed.     Called and spoke with pt. Notified pt of recommendations from MP. Pt verbalizes understanding and is agreeable to plan.

## 2024-11-06 ENCOUNTER — NURSE ONLY (OUTPATIENT)
Dept: INTERNAL MEDICINE CLINIC | Facility: CLINIC | Age: 74
End: 2024-11-06
Payer: MEDICARE

## 2024-11-06 PROCEDURE — 96372 THER/PROPH/DIAG INJ SC/IM: CPT | Performed by: INTERNAL MEDICINE

## 2024-11-06 RX ADMIN — CYANOCOBALAMIN 1000 MCG: 1000 INJECTION, SOLUTION INTRAMUSCULAR; SUBCUTANEOUS at 09:45:00

## 2024-12-06 NOTE — TELEPHONE ENCOUNTER
Gena Denis requesting Medication Refill for:    Medication name and dose (copy and paste from medication list):   Medication Quantity Refills Start End   rosuvastatin 10 MG Oral Tab -- --  --   Sig:   Take 1 tablet (10 mg total) by mouth nightly.     Route:   Oral     Class:   Historical     Order #:   [id number]       If medication is not on medication list - transfer patient to RN queue for triage    Preferred Pharmacy:   [id number] [address] [place] [address] [phone], [phone]     LOV: 9/12/2024   Last Refill date: unknown  Next Scheduled appointment:   Future Appointments   No Future Appt.

## 2024-12-11 NOTE — TELEPHONE ENCOUNTER
Please Review. Protocol Failed; No Protocol   Please advise medication is patient external reported or historical.   Rosuvastatin previously written by Tawnya Castro MD ([facility])  Requested Prescriptions   Pending Prescriptions Disp Refills    rosuvastatin 10 MG Oral Tab  0     Sig: Take 1 tablet (10 mg total) by mouth nightly.       Cholesterol Medication Protocol Failed - 12/11/2024  3:00 PM        Failed - ALT < 80     Lab Results   Component Value Date    ALT 14 10/11/2020             Failed - ALT resulted within past year        Failed - Lipid panel within past 12 months     Lab Results   Component Value Date    CHOLEST 166 02/06/2024    TRIG 54 02/06/2024    HDL 61 02/06/2024    LDL 94 02/06/2024    VLDL 21 06/28/2012    CHOLHDLRATIO 2 02/06/2024             Passed - In person appointment or virtual visit in the past 12 mos or appointment in next 3 mos     Recent Outpatient Visits              1 month ago     [address]    Nurse Only    3 months ago Lumbar radiculopathy    [address]    Office Visit    3 months ago Acute left-sided low back pain without sciatica    [address] Supriya Huang APRN    Office Visit    4 months ago B12 deficiency    [address]    Nurse Only    5 months ago Vitamin B12 deficiency    [address]    Nurse Only          Future Appointments         Provider Department Appt Notes    In 2 days PF DEXA 1 [facility] DEXA - [place] QA: MM Consent, Demographics,    Any changes from last test    In 6 days YK XR RM1; YK NAZ RM1 [facility]     In 2 months Alia Hernandez DO [facility], [facility] FU 6 months / Osteoporosis   LOV 6/3/24                           Future  Appointments         Provider Department Appt Notes    In 2 days PF DEXA RM1 [facility] DEXA - [place] QA: MM Consent, Demographics,    Any changes from last test    In 6 days YK XR RM1; YK NAZ RM1 [facility] Mammography - [place]     In 2 months Alia Hernandez DO [facility], [facility] 6 months / Osteoporosis   LOV 6/3/24          Recent Outpatient Visits              1 month ago     [facility], [address] [place] [address]    Nurse Only    3 months ago Lumbar radiculopathy    [facility], [address] [place] [address], [place]    Office Visit    3 months ago Acute left-sided low back pain without sciatica    [facility], [address] [place] [address], Supriya Arzate APRN    Office Visit    4 months ago B12 deficiency    [facility], [address] [place] [address]    Nurse Only    5 months ago Vitamin B12 deficiency    [facility], [address] [place] [address]    Nurse Only

## 2024-12-13 ENCOUNTER — HOSPITAL ENCOUNTER (OUTPATIENT)
Dept: BONE DENSITY | Age: 74
Discharge: HOME | End: 2024-12-13
Attending: STUDENT IN AN ORGANIZED HEALTH CARE EDUCATION/TRAINING PROGRAM
Payer: MEDICARE

## 2024-12-13 ENCOUNTER — TELEPHONE (OUTPATIENT)
Dept: INTERNAL MEDICINE CLINIC | Facility: CLINIC | Age: 74
End: 2024-12-13

## 2024-12-13 DIAGNOSIS — Z00.00 ROUTINE GENERAL MEDICAL EXAMINATION AT A HEALTH CARE FACILITY: ICD-10-CM

## 2024-12-13 DIAGNOSIS — E06.3 HYPOTHYROIDISM DUE TO HASHIMOTO'S THYROIDITIS: Primary | ICD-10-CM

## 2024-12-13 DIAGNOSIS — E78.5 DYSLIPIDEMIA: ICD-10-CM

## 2024-12-13 DIAGNOSIS — M81.0 AGE-RELATED OSTEOPOROSIS WITHOUT CURRENT PATHOLOGICAL FRACTURE: ICD-10-CM

## 2024-12-13 PROCEDURE — 77080 DXA BONE DENSITY AXIAL: CPT | Performed by: STUDENT IN AN ORGANIZED HEALTH CARE EDUCATION/TRAINING PROGRAM

## 2024-12-13 NOTE — TELEPHONE ENCOUNTER
Future Appointments   Date Time Provider Department Center   12/26/2024  1:40 PM Ra Kenny, DO [facility]

## 2024-12-13 NOTE — TELEPHONE ENCOUNTER
Patient called request labs prior to their annual physical.  Annual physical scheduled for   Future Appointments   Date Time Provider Department Center   12/26/2024  1:40 PM Ra Kenny, DO [facility]        Please order labs. Patient preferred lab is [facility]   Patient informed request was sent to clinical team.  Patient informed to fast for labs.  No callback required.

## 2024-12-15 RX ORDER — ROSUVASTATIN CALCIUM 10 MG/1
10 TABLET, COATED ORAL NIGHTLY
Qty: 90 TABLET | Refills: 3 | Status: SHIPPED | OUTPATIENT
Start: 2024-12-15 | End: 2025-12-10

## 2024-12-17 ENCOUNTER — HOSPITAL ENCOUNTER (OUTPATIENT)
Dept: MAMMOGRAPHY | Age: 74
Discharge: HOME | End: 2024-12-17
Attending: INTERNAL MEDICINE
Payer: MEDICARE

## 2024-12-17 DIAGNOSIS — Z12.31 ENCOUNTER FOR SCREENING MAMMOGRAM FOR MALIGNANT NEOPLASM OF BREAST: ICD-10-CM

## 2024-12-17 PROCEDURE — 77063 BREAST TOMOSYNTHESIS BI: CPT | Performed by: INTERNAL MEDICINE

## 2024-12-17 PROCEDURE — 77067 SCR MAMMO BI INCL CAD: CPT | Performed by: INTERNAL MEDICINE

## 2024-12-18 RX ORDER — ROSUVASTATIN CALCIUM 10 MG/1
10 TABLET, COATED ORAL NIGHTLY
Qty: 90 TABLET | Refills: 0 | OUTPATIENT
Start: 2024-12-18

## 2024-12-18 NOTE — TELEPHONE ENCOUNTER
Rosuvastatin 10m year supply sent to [facility] pharmacy in [place] on 12/15/2024

## 2024-12-19 ENCOUNTER — LAB ENCOUNTER (OUTPATIENT)
Dept: LAB | Age: 74
End: 2024-12-19
Attending: STUDENT IN AN ORGANIZED HEALTH CARE EDUCATION/TRAINING PROGRAM
Payer: MEDICARE

## 2024-12-19 DIAGNOSIS — E78.5 DYSLIPIDEMIA: ICD-10-CM

## 2024-12-19 DIAGNOSIS — E53.8 B12 DEFICIENCY: ICD-10-CM

## 2024-12-19 DIAGNOSIS — E06.3 HYPOTHYROIDISM DUE TO HASHIMOTO'S THYROIDITIS: ICD-10-CM

## 2024-12-19 DIAGNOSIS — M81.0 SENILE OSTEOPOROSIS: Primary | ICD-10-CM

## 2024-12-19 DIAGNOSIS — Z00.00 ROUTINE GENERAL MEDICAL EXAMINATION AT A HEALTH CARE FACILITY: ICD-10-CM

## 2024-12-19 LAB
ALBUMIN SERPL-MCNC: 4.2 G/DL (ref 3.2–4.8)
ALBUMIN/GLOB SERPL: 1.4 {RATIO} (ref 1–2)
ALP LIVER SERPL-CCNC: 59 U/L
ALT SERPL-CCNC: 25 U/L
ANION GAP SERPL CALC-SCNC: 5 MMOL/L (ref 0–18)
AST SERPL-CCNC: 34 U/L (ref ?–34)
BASOPHILS # BLD AUTO: 0.05 X10(3) UL (ref 0–0.2)
BASOPHILS NFR BLD AUTO: 0.9 %
BILIRUB SERPL-MCNC: 0.7 MG/DL (ref 0.2–1.1)
BUN BLD-MCNC: 25 MG/DL (ref 9–23)
CALCIUM BLD-MCNC: 9.4 MG/DL (ref 8.7–10.4)
CHLORIDE SERPL-SCNC: 106 MMOL/L (ref 98–112)
CHOLEST SERPL-MCNC: 197 MG/DL (ref ?–200)
CO2 SERPL-SCNC: 27 MMOL/L (ref 21–32)
CREAT BLD-MCNC: 1.02 MG/DL
EGFRCR SERPLBLD CKD-EPI 2021: 57 ML/MIN/1.73M2 (ref 60–?)
EOSINOPHIL # BLD AUTO: 0.19 X10(3) UL (ref 0–0.7)
EOSINOPHIL NFR BLD AUTO: 3.4 %
ERYTHROCYTE [DISTWIDTH] IN BLOOD BY AUTOMATED COUNT: 13.5 %
FASTING PATIENT LIPID ANSWER: YES
FASTING STATUS PATIENT QL REPORTED: YES
GLOBULIN PLAS-MCNC: 3 G/DL (ref 2–3.5)
GLUCOSE BLD-MCNC: 94 MG/DL (ref 70–99)
HCT VFR BLD AUTO: 40.6 %
HDLC SERPL-MCNC: 49 MG/DL (ref 40–59)
HGB BLD-MCNC: 12.8 G/DL
IMM GRANULOCYTES # BLD AUTO: 0.02 X10(3) UL (ref 0–1)
IMM GRANULOCYTES NFR BLD: 0.4 %
LDLC SERPL CALC-MCNC: 132 MG/DL (ref ?–100)
LYMPHOCYTES # BLD AUTO: 3.01 X10(3) UL (ref 1–4)
LYMPHOCYTES NFR BLD AUTO: 53.5 %
MCH RBC QN AUTO: 29.2 PG (ref 26–34)
MCHC RBC AUTO-ENTMCNC: 31.5 G/DL (ref 31–37)
MCV RBC AUTO: 92.7 FL
MONOCYTES # BLD AUTO: 0.54 X10(3) UL (ref 0.1–1)
MONOCYTES NFR BLD AUTO: 9.6 %
NEUTROPHILS # BLD AUTO: 1.82 X10 (3) UL (ref 1.5–7.7)
NEUTROPHILS # BLD AUTO: 1.82 X10(3) UL (ref 1.5–7.7)
NEUTROPHILS NFR BLD AUTO: 32.2 %
NONHDLC SERPL-MCNC: 148 MG/DL (ref ?–130)
OSMOLALITY SERPL CALC.SUM OF ELEC: 290 MOSM/KG (ref 275–295)
PHOSPHATE SERPL-MCNC: 3.8 MG/DL (ref 2.4–5.1)
PLATELET # BLD AUTO: 284 10(3)UL (ref 150–450)
POTASSIUM SERPL-SCNC: 4.1 MMOL/L (ref 3.5–5.1)
PROT SERPL-MCNC: 7.2 G/DL (ref 5.7–8.2)
RBC # BLD AUTO: 4.38 X10(6)UL
SODIUM SERPL-SCNC: 138 MMOL/L (ref 136–145)
T4 FREE SERPL-MCNC: 1.4 NG/DL (ref 0.8–1.7)
TRIGL SERPL-MCNC: 86 MG/DL (ref 30–149)
TSI SER-ACNC: 2.52 UIU/ML (ref 0.55–4.78)
VIT B12 SERPL-MCNC: 729 PG/ML (ref 211–911)
VIT D+METAB SERPL-MCNC: 74.7 NG/ML (ref 30–100)
VLDLC SERPL CALC-MCNC: 16 MG/DL (ref 0–30)
WBC # BLD AUTO: 5.6 X10(3) UL (ref 4–11)

## 2024-12-19 PROCEDURE — 84080 ASSAY ALKALINE PHOSPHATASES: CPT

## 2024-12-19 PROCEDURE — 82306 VITAMIN D 25 HYDROXY: CPT | Performed by: STUDENT IN AN ORGANIZED HEALTH CARE EDUCATION/TRAINING PROGRAM

## 2024-12-19 PROCEDURE — 80061 LIPID PANEL: CPT

## 2024-12-19 PROCEDURE — 82523 COLLAGEN CROSSLINKS: CPT | Performed by: STUDENT IN AN ORGANIZED HEALTH CARE EDUCATION/TRAINING PROGRAM

## 2024-12-19 PROCEDURE — 82607 VITAMIN B-12: CPT

## 2024-12-19 PROCEDURE — 36415 COLL VENOUS BLD VENIPUNCTURE: CPT | Performed by: STUDENT IN AN ORGANIZED HEALTH CARE EDUCATION/TRAINING PROGRAM

## 2024-12-19 PROCEDURE — 84443 ASSAY THYROID STIM HORMONE: CPT | Performed by: STUDENT IN AN ORGANIZED HEALTH CARE EDUCATION/TRAINING PROGRAM

## 2024-12-19 PROCEDURE — 84100 ASSAY OF PHOSPHORUS: CPT | Performed by: STUDENT IN AN ORGANIZED HEALTH CARE EDUCATION/TRAINING PROGRAM

## 2024-12-19 PROCEDURE — 84439 ASSAY OF FREE THYROXINE: CPT | Performed by: STUDENT IN AN ORGANIZED HEALTH CARE EDUCATION/TRAINING PROGRAM

## 2024-12-19 PROCEDURE — 85025 COMPLETE CBC W/AUTO DIFF WBC: CPT

## 2024-12-19 PROCEDURE — 80053 COMPREHEN METABOLIC PANEL: CPT

## 2024-12-23 LAB — C-TELOPEPTIDE: 101 PG/ML

## 2024-12-24 LAB — ALKALINE PHOSPHATASE BONE SPECIFIC: 8.9 UG/L

## 2024-12-25 PROBLEM — E06.3 HYPOTHYROIDISM DUE TO HASHIMOTO'S THYROIDITIS: Status: ACTIVE | Noted: 2024-12-25

## 2024-12-25 PROBLEM — N28.1 BILATERAL RENAL CYSTS: Status: ACTIVE | Noted: 2024-12-25

## 2024-12-25 NOTE — PROGRESS NOTES
Subjective:   Gena Denis is a 74 year old female who presents for a MA AHA (Medicare Advantage Annual Health Assessment) and Initial Annual Wellness Visit (outside the first 12 months of Medicare eligibility, no prior AWV) and scheduled follow up of multiple significant but stable problems. She is doing well. She is working with  biweekly. She is getting strong for trip to [place] in March. She is sexually active and has had dysuria and urinary incontinence.     Osteoporosis: Management per Endocrinology.  Depression: Management per Psychiatry.  YESI: CPAP  Vitamin B12 Deficiency: injections kary 8 weeks not sure she needs it  Hypothyroidism: Management per Endocrinology.  Hyperlipidemia: . Patient had been out of statin for over 1 week though.  History of basal cell carcinoma: Following with Dermatology regularly.  Renal cysts: Previously referred to Urology but has not scheduled appointment yet.      History/Other:   Fall Risk Assessment:   She has been screened for Falls and is High Risk. Fall Prevention information provided to patient in After Visit Summary.    Do you feel unsteady when standing or walking?: (Patient-Rptd) No  Do you worry about falling?: (Patient-Rptd) Yes  Have you fallen in the past year?: (Patient-Rptd) No     Cognitive Assessment:   She had a completely normal cognitive assessment - see flowsheet entries     Functional Ability/Status:   Gena Denis has some abnormal functions as listed below:  She has Hearing problems based on screening of functional status.    Depression Screening (PHQ):  PHQ-2 SCORE: 0  , done 12/26/2024   Last [place] Suicide Screening on 12/26/2024 was No Risk.     Advanced Directives:   She does have a Living Will but we do NOT have it on file in Epic.    She does have a POA but we do NOT have it on file in Epic.      Patient Active Problem List   Diagnosis    Dyslipidemia    Benign paroxysmal vertigo    Personal history of other  malignant neoplasm of skin    Vitamin B12 deficiency    Age-related osteoporosis without current pathological fracture    YESI (obstructive sleep apnea)    Depression    Vitamin D deficiency    Family history of heart disease    Bronchiectasis without complication ([facility])    Agatston CAC score, <100    Renal cyst    Hypothyroidism due to Hashimoto's thyroiditis    Bilateral renal cysts    Degeneration of intervertebral disc of lumbar region with discogenic back pain    CKD (chronic kidney disease) stage 3, GFR 30-59 ml/min ([facility])    Varicose vein    Overweight (BMI 25.0-29.9)     Allergies:  She has No Known Allergies.    Current Medications:  Outpatient Medications Marked as Taking for the 12/26/24 encounter (Office Visit) with Ra Kenny, DO   Medication Sig    alendronate 70 MG Oral Tab Take 1 tablet (70 mg total) by mouth once a week.    rosuvastatin 10 MG Oral Tab Take 1 tablet (10 mg total) by mouth nightly.    levothyroxine 112 MCG Oral Tab Take 1 tablet (112 mcg total) by mouth before breakfast.    Estradiol 10 MCG Vaginal Tab Place one tablet into the vagina at bedtime for two weeks and then three times weekly    Cholecalciferol 125 MCG (5000 UT) Oral Tab Take 1 tablet (5,000 Units total) by mouth daily.    Calcium Carbonate-Vit D-Min (CALTRATE 600+D PLUS MINERALS OR) Take by mouth daily.    Multiple Vitamin (MULTIVITAMIN ADULT OR) Take 1 tablet by mouth daily.    Nutritional Supplements (JUICE PLUS FIBRE OR) Take by mouth 2 (two) times a day.    Sertraline HCl (ZOLOFT) 100 MG Oral Tab Take 1 tablet (100 mg total) by mouth daily.    BuPROPion HCl (WELLBUTRIN XL) 150 MG Oral Tablet SR 24 Hr Take  by mouth daily.    FISH OIL CONCENTRATE 1000 MG OR CAPS 6 grams daily       Medical History:  She  has a past medical history of allergic rhinitis, anemia, Arthritis, Cancer ([facility]), Depression, Esophageal reflux, Fatigue, Food intolerance, gluten, High cholesterol, hypothyroidism, Irregular bowel habits,  osteopenia, Osteoporosis, and Sleep apnea.  Surgical History:  She  has a past surgical history that includes colonoscopy; colonoscopy (01/2010); other (2000); other surgical history; skin surgery (12/07/2016); cataract (04/2021); skin surgery; and other surgical history (Bilateral, 2018).   Family History:  Her family history includes Cancer in her brother and mother; Diabetes in her father; Hypertension in her mother.  Social History:  She  reports that she has never smoked. She has never used smokeless tobacco. She reports current alcohol use of about 2.0 standard drinks of alcohol per week. She reports that she does not use drugs.    Tobacco:  She has never smoked tobacco.    CAGE Alcohol Screen:   She has been screened for alcohol abuse and her score is not 0:  Cut: Have you ever felt you should Cut down on your drinking?: (Patient-Rptd) Yes  Annoyed: Have people Annoyed you by criticizing your drinking?: (Patient-Rptd) No  Guilty: Have you ever felt bad or Guilty about your drinking?: (Patient-Rptd) No  Eye Opener: Have you ever had a drink first thing in the morning to steady your nerves or to get rid of a hangover (Eye opener)?: (Patient-Rptd) No  Total Score: (Patient-Rptd) 1      Patient Care Team:  Ra Kenny DO as PCP - General (Internal Medicine)  Supriya Huang APRN (Nurse Practitioner)    Review of Systems  No f/c/chest pain or sob. No cough. No abd pain/n/v/d. No ha or dizziness. No numbness, tingling, or weakness.     Objective:   Physical Exam  /75   Pulse 66   Temp 96.5 °F (35.8 °C) (Temporal)   Resp 16   Ht 5' 4.17\" (1.63 m)   Wt 167 lb (75.8 kg)   SpO2 99%   BMI 28.51 kg/m²  Estimated body mass index is 28.51 kg/m² as calculated from the following:    Height as of this encounter: 5' 4.17\" (1.63 m).    Weight as of this encounter: 167 lb (75.8 kg).  Constitutional: Oriented to person, place, and time. No distress.   HEENT:  Normocephalic and atraumatic.Tympanic membranes  appear normal. Oropharynx is clear and moist.   Eyes: Conjunctivae not injected.  Extraocular movements are intact  Neck: Full ROM.  Neck supple.  No thyromegaly  Cardiovascular: Normal rate, regular rhythm and intact distal pulses.  No murmur, rubs or gallops.   Pulmonary/Chest: Effort normal and breath sounds normal. No respiratory distress.  Abdominal: Soft. Bowel sounds are normal. Non tender, no masses, no organomegaly or hernias.  Musculoskeletal: No edema in bilateral lower extremities.  Strength is 5/5 in bilateral upper and lower extremities. No tenderness of spinous processes of lumbar, thoracic or cervical spine.  Lymphadenopathy: No cervical adenopathy.   Neurological: No focal neurologic deficits.  Cranial nerves II through XII are intact.    Skin: Skin is warm and dry. No rash on visualized skin.  Psychiatric: Normal mood and affect.     Medicare Hearing Assessment:   Hearing Screening    Time taken: 12/26/2024  1:55 PM  Screening Method: Finger Rub  Finger Rub Result: Pass       Visual Acuity:   Right Eye Visual Acuity: Uncorrected Right Eye Chart Acuity: 20/100   Left Eye Visual Acuity: Uncorrected Left Eye Chart Acuity: 20/40   Both Eyes Visual Acuity: Uncorrected Both Eyes Chart Acuity: 20/50          Assessment & Plan:   Gena Denis is a 74 year old female who presents for a Medicare Assessment.     1. Wellness examination (Primary)  2. Hypothyroidism due to Hashimoto's thyroiditis Stable, management per [facility].  3. Age-related osteoporosis without current pathological fracture Stable, management per [facility].  4. Vitamin B12 deficiency Will do injections every other months and will also check level periodically.  5. YESI (obstructive sleep apnea) Continue nightly CPAP.  6. Bronchiectasis without complication (HCC) Asymptomatic. Patient to follow up with pulmonologist regarding this.  7. Vitamin D deficiency Continue daily supplementation.  8. Depression,  unspecified depression type Stable, management per psychiatrist.  9. Family history of heart disease CAC Score 14 in LAD. Continue current dose of Rosuvastatin 10 mg. Per patient she had been out of medication when lab done.  10. Dyslipidemia  -     Lipid Panel; Future; Expected date: 12/26/2024  11. Agatston CAC score, <100 See 9.   12. Personal history of other malignant neoplasm of skin Continue to follow up with Dermatology.  13. Bilateral renal cysts Slightly increased in size on 9/26/2024 MRI Lumbar when compared to 6/26/2023 CT A/P. Instructed patient to schedule appointment with Urology, previously referred by SD.   14. Degeneration of intervertebral disc of lumbar region with discogenic back pain Patient completed PT. Continue exercising with . Now only pain with certain positions which she will try to avoid if able.  15. Overweight (BMI 25.0-29.9)  -     DIETITIAN EDUCATION INITIAL, DIET (INTERNAL)  16. Varicose vein  -     Vascular Surgery - In Network  17. Dysuria  -     Urinalysis with Culture Reflex; Future; Expected date: 12/26/2024  -     Urinalysis with Culture Reflex  18. Needs flu shot  -     High Dose Fluzone trivalent influenza, 65 yrs+ PFS [66294]  19. Stage 3a chronic kidney disease (HCC) Stable, repeat metabolic panel in 6 months.  20. Elevated AST (SGOT)  -     Hepatic Function Panel (7); Future; Expected date: 12/26/2024      Osteoporosis: Management per Endocrinology.  Depression: Management per Psychiatry.  YESI: CPAP  Vitamin B12 Deficiency: injections kary 8 weeks not sure she needs it  Hypothyroidism: Management per Endocrinology.  Hyperlipidemia: . Patient had been out of statin for over 1 week though.  History of basal cell carcinoma: Following with Dermatology regularly.  Renal cysts: Previously referred to Urology but has not scheduled appointment yet.  The patient indicates understanding of these issues and agrees to the plan.  Reinforced healthy diet, lifestyle, and  exercise.      No follow-ups on file.     Ra Kenny, , 12/25/2024     Supplementary Documentation:   General Health:  In the past six months, have you lost more than 10 pounds without trying?: (Patient-Rptd) 2 - No  Has your appetite been poor?: (Patient-Rptd) No  Type of Diet: Balanced  How does the patient maintain a good energy level?: (Patient-Rptd) Appropriate Exercise;Stretching  How would you describe your daily physical activity?: (Patient-Rptd) Moderate  How would you describe your current health state?: (Patient-Rptd) Good  How do you maintain positive mental well-being?: (Patient-Rptd) Social Interaction;Puzzles;Visiting Friends;Visiting Family  On a scale of 0 to 10, with 0 being no pain and 10 being severe pain, what is your pain level?: (Patient-Rptd) 3 - (Mild)  In the past six months, have you experienced urine leakage?: (Patient-Rptd) 1-Yes  At any time do you feel concerned for the safety/well-being of yourself and/or your children, in your home or elsewhere?: (Patient-Rptd) No  Have you had any immunizations at another office such as Influenza, Hepatitis B, Tetanus, or Pneumococcal?: (Patient-Rptd) Yes    Health Maintenance   Topic Date Due    MA Annual Health Assessment  Never done    COVID-19 Vaccine (7 - 2024-25 season) 09/01/2024    Influenza Vaccine (1) 10/01/2024    Colorectal Cancer Screening  10/14/2025    DEXA Scan  Completed    Annual Depression Screening  Completed    Fall Risk Screening (Annual)  Completed    Pneumococcal Vaccine: 65+ Years  Completed    Zoster Vaccines  Completed    Mammogram  Discontinued

## 2024-12-26 ENCOUNTER — OFFICE VISIT (OUTPATIENT)
Dept: INTERNAL MEDICINE CLINIC | Facility: CLINIC | Age: 74
End: 2024-12-26
Payer: MEDICARE

## 2024-12-26 VITALS
WEIGHT: 167 LBS | HEART RATE: 66 BPM | SYSTOLIC BLOOD PRESSURE: 130 MMHG | BODY MASS INDEX: 28.51 KG/M2 | OXYGEN SATURATION: 99 % | TEMPERATURE: 97 F | RESPIRATION RATE: 16 BRPM | HEIGHT: 64.17 IN | DIASTOLIC BLOOD PRESSURE: 75 MMHG

## 2024-12-26 DIAGNOSIS — N28.1 BILATERAL RENAL CYSTS: ICD-10-CM

## 2024-12-26 DIAGNOSIS — E06.3 HYPOTHYROIDISM DUE TO HASHIMOTO'S THYROIDITIS: ICD-10-CM

## 2024-12-26 DIAGNOSIS — N18.31 STAGE 3A CHRONIC KIDNEY DISEASE (HCC): Chronic | ICD-10-CM

## 2024-12-26 DIAGNOSIS — R93.1 AGATSTON CAC SCORE, <100: ICD-10-CM

## 2024-12-26 DIAGNOSIS — M81.0 AGE-RELATED OSTEOPOROSIS WITHOUT CURRENT PATHOLOGICAL FRACTURE: ICD-10-CM

## 2024-12-26 DIAGNOSIS — I83.90 VARICOSE VEIN: ICD-10-CM

## 2024-12-26 DIAGNOSIS — F32.A DEPRESSION, UNSPECIFIED DEPRESSION TYPE: ICD-10-CM

## 2024-12-26 DIAGNOSIS — Z23 NEEDS FLU SHOT: ICD-10-CM

## 2024-12-26 DIAGNOSIS — M51.360 DEGENERATION OF INTERVERTEBRAL DISC OF LUMBAR REGION WITH DISCOGENIC BACK PAIN: ICD-10-CM

## 2024-12-26 DIAGNOSIS — Z00.00 WELLNESS EXAMINATION: Primary | ICD-10-CM

## 2024-12-26 DIAGNOSIS — E66.3 OVERWEIGHT (BMI 25.0-29.9): ICD-10-CM

## 2024-12-26 DIAGNOSIS — J47.9 BRONCHIECTASIS WITHOUT COMPLICATION (HCC): ICD-10-CM

## 2024-12-26 DIAGNOSIS — E55.9 VITAMIN D DEFICIENCY: ICD-10-CM

## 2024-12-26 DIAGNOSIS — G47.33 OSA (OBSTRUCTIVE SLEEP APNEA): ICD-10-CM

## 2024-12-26 DIAGNOSIS — Z82.49 FAMILY HISTORY OF HEART DISEASE: ICD-10-CM

## 2024-12-26 DIAGNOSIS — R74.01 ELEVATED AST (SGOT): ICD-10-CM

## 2024-12-26 DIAGNOSIS — R30.0 DYSURIA: ICD-10-CM

## 2024-12-26 DIAGNOSIS — E53.8 VITAMIN B12 DEFICIENCY: ICD-10-CM

## 2024-12-26 DIAGNOSIS — Z85.828 PERSONAL HISTORY OF OTHER MALIGNANT NEOPLASM OF SKIN: ICD-10-CM

## 2024-12-26 DIAGNOSIS — E78.5 DYSLIPIDEMIA: ICD-10-CM

## 2024-12-26 PROBLEM — N18.30 CKD (CHRONIC KIDNEY DISEASE) STAGE 3, GFR 30-59 ML/MIN (HCC): Chronic | Status: ACTIVE | Noted: 2024-12-26

## 2024-12-26 LAB
BILIRUB UR QL STRIP.AUTO: NEGATIVE
CLARITY UR REFRACT.AUTO: CLEAR
COLOR UR AUTO: COLORLESS
GLUCOSE UR STRIP.AUTO-MCNC: NORMAL MG/DL
KETONES UR STRIP.AUTO-MCNC: NEGATIVE MG/DL
LEUKOCYTE ESTERASE UR QL STRIP.AUTO: NEGATIVE
NITRITE UR QL STRIP.AUTO: NEGATIVE
PH UR STRIP.AUTO: 7 [PH] (ref 5–8)
PROT UR STRIP.AUTO-MCNC: NEGATIVE MG/DL
RBC UR QL AUTO: NEGATIVE
SP GR UR STRIP.AUTO: 1.01 (ref 1–1.03)
UROBILINOGEN UR STRIP.AUTO-MCNC: NORMAL MG/DL

## 2024-12-26 PROCEDURE — 81003 URINALYSIS AUTO W/O SCOPE: CPT | Performed by: INTERNAL MEDICINE

## 2024-12-26 RX ORDER — ALENDRONATE SODIUM 70 MG/1
70 TABLET ORAL WEEKLY
COMMUNITY

## 2024-12-27 DIAGNOSIS — R32 URINARY INCONTINENCE, UNSPECIFIED TYPE: Primary | ICD-10-CM

## 2025-01-05 DIAGNOSIS — M81.0 AGE-RELATED OSTEOPOROSIS WITHOUT CURRENT PATHOLOGICAL FRACTURE: Primary | ICD-10-CM

## 2025-01-06 RX ORDER — ALENDRONATE SODIUM 70 MG/1
70 TABLET ORAL WEEKLY
Qty: 12 TABLET | Refills: 0 | Status: SHIPPED | OUTPATIENT
Start: 2025-01-06

## 2025-01-06 NOTE — TELEPHONE ENCOUNTER
Endocrine refill protocol for anti-resorptive and anabolic agents:     Protocol Criteria: PASSED Reason: N/A    If all below requirements are met, send a 90-day supply with 1 refill per provider protocol.    Verify appointment with Endocrinology completed in the last 12 months or scheduled in the next 6 months.  Confirm timeline for Tymlos and Forteo- patient's time on medication should not have exceeded 2 years.  Last completed office visit:6/3/2024 Alia Hernandez DO   Next scheduled follow up:   Future Appointments   Date Time Provider Department Center   2/21/2025  9:00 AM Alia Hernandez DO [id number] [place]       Routed for review

## 2025-02-04 ENCOUNTER — TELEPHONE (OUTPATIENT)
Dept: INTERNAL MEDICINE CLINIC | Facility: CLINIC | Age: 75
End: 2025-02-04

## 2025-02-04 NOTE — TELEPHONE ENCOUNTER
Patient stated she's been getting b12 shots, but hasn't in a couple months.  Patient wants to know if Dr. Ra Kenny wants her to get some labs done and resume b12. Patient stated she has continued fatigue.  Please advise.

## 2025-02-04 NOTE — TELEPHONE ENCOUNTER
B12 level checked 12/19/24    Component  Ref Range & Units 12/19/24  8:15 AM   Vitamin B12  211 - 911 pg/mL 729   Comment: Vitamin B12 Reference Range  Deficient:      <150 pg/mL  Indeterminate   150 - 211 pg/mL  Normal:         211 - 911 pg/mL       Routing to Dr. Kenny for review/recs

## 2025-02-05 ENCOUNTER — TELEPHONE (OUTPATIENT)
Dept: INTERNAL MEDICINE CLINIC | Facility: CLINIC | Age: 75
End: 2025-02-05

## 2025-02-05 DIAGNOSIS — E53.8 VITAMIN B12 DEFICIENCY: Primary | ICD-10-CM

## 2025-02-05 RX ORDER — CYANOCOBALAMIN 1000 UG/ML
1000 INJECTION, SOLUTION INTRAMUSCULAR; SUBCUTANEOUS ONCE
Status: COMPLETED | OUTPATIENT
Start: 2025-02-06 | End: 2025-02-06

## 2025-02-05 NOTE — TELEPHONE ENCOUNTER
February 4, 2025  Ra Kenny, DO to [facility] Clinical Staff       2/4/25  9:58 PM  Note     Can do monthly injections on b12          B12 injection order placed

## 2025-02-05 NOTE — TELEPHONE ENCOUNTER
Future Appointments   Date Time Provider Department Center   2/6/2025 11:00 AM [facility] NURSE [facility]   2/21/2025  9:00 AM Alia Hernandez DO [id number] [place]     Scheduled for nurse visit for B12

## 2025-02-05 NOTE — TELEPHONE ENCOUNTER
Patient scheduled for B12 injection    Future Appointments   Date Time Provider Department Center   2/6/2025 11:00 AM [facility]

## 2025-02-06 ENCOUNTER — NURSE ONLY (OUTPATIENT)
Dept: INTERNAL MEDICINE CLINIC | Facility: CLINIC | Age: 75
End: 2025-02-06
Payer: MEDICARE

## 2025-02-06 PROCEDURE — 96372 THER/PROPH/DIAG INJ SC/IM: CPT | Performed by: INTERNAL MEDICINE

## 2025-02-06 RX ADMIN — CYANOCOBALAMIN 1000 MCG: 1000 INJECTION, SOLUTION INTRAMUSCULAR; SUBCUTANEOUS at 11:02:00

## 2025-02-21 ENCOUNTER — OFFICE VISIT (OUTPATIENT)
Facility: CLINIC | Age: 75
End: 2025-02-21
Payer: MEDICARE

## 2025-02-21 VITALS
SYSTOLIC BLOOD PRESSURE: 142 MMHG | HEIGHT: 64 IN | WEIGHT: 167 LBS | BODY MASS INDEX: 28.51 KG/M2 | DIASTOLIC BLOOD PRESSURE: 78 MMHG | HEART RATE: 67 BPM

## 2025-02-21 DIAGNOSIS — E55.9 VITAMIN D DEFICIENCY: ICD-10-CM

## 2025-02-21 DIAGNOSIS — E06.3 HYPOTHYROIDISM DUE TO HASHIMOTO'S THYROIDITIS: ICD-10-CM

## 2025-02-21 DIAGNOSIS — M81.0 AGE-RELATED OSTEOPOROSIS WITHOUT CURRENT PATHOLOGICAL FRACTURE: Primary | ICD-10-CM

## 2025-02-21 PROCEDURE — 1160F RVW MEDS BY RX/DR IN RCRD: CPT | Performed by: STUDENT IN AN ORGANIZED HEALTH CARE EDUCATION/TRAINING PROGRAM

## 2025-02-21 PROCEDURE — 99214 OFFICE O/P EST MOD 30 MIN: CPT | Performed by: STUDENT IN AN ORGANIZED HEALTH CARE EDUCATION/TRAINING PROGRAM

## 2025-02-21 PROCEDURE — 1159F MED LIST DOCD IN RCRD: CPT | Performed by: STUDENT IN AN ORGANIZED HEALTH CARE EDUCATION/TRAINING PROGRAM

## 2025-02-21 RX ORDER — ALENDRONATE SODIUM 70 MG/1
70 TABLET ORAL WEEKLY
Qty: 4 TABLET | Refills: 0 | Status: SHIPPED | OUTPATIENT
Start: 2025-02-21

## 2025-02-21 NOTE — PROGRESS NOTES
ENDOCRINOLOGY, DIABETES & METABOLISM CONSULT NOTE   Initial Consult Date: 05/02/24  Name: Gena Denis   Referring Physician: No ref. provider found    Subjective:    HISTORY OF PRESENT ILLNESS:   Gena Denis is a 74 year old female seen in consultation for her osteoporosis    Patient presents to the clinic for an initial bone health evaluation due to a history of DEXA confirmed osteoporosis.     OSTEOPOROSIS RISK FACTORS ASSESSMENT  Postmenopausal Yes, age 51, was on HRT for 3 years   Maternal hx of osteoporosis or hx of parental hip fx:  Maternal grandmother passed away from bone cancer in 1963. She was diagnosed after a hip fracture.    Recent Fracture: Yes, compression fracture seen on CT last summer 2023 (unclear when this was from [id number])    Previous fracture after the age of 50:  No   Hx of kidney stones No   Frequent falls Yes, previously would fall twice a year but since she has been doing weight training and balance with professional  she notes improvement    Poor vision No   Decrease in height No   New or Worsening Back Pain NoIntermittent low back pain that resolves   History of Vit D insufficiency:   Current Vitamin D supplementation: No  Vitamin D 5000 units + K daily   Poor intake of calcium  Daily calcium intake: Yes, sensitivity to cows milk. Has 0.5 oz of goat cheese on toast   Calcium citrate 1200 mg total   Caffeine intake Yes, 36 oz of coffee daily   Smoking No   Alcohol intake >3/d:  No   Hx of thyroid disease Yes, hypothyroidism diagnosed around 50 + years ago   Hx of calcium problems or hyperparathyroidism No   Previous treatment for osteoporosis Yes, fosamax for the last 3 years   Malabsorption, chronic diarrhea, celiac sprue   No notes symptoms that could be consistent with celiac's   History of RA  No   History of skeletal radiation No   History of eating disorder No     Intake of medications that cause bone loss:  Long term steroid use: No  Aromatase inhibitor:  No  Seizure medications: No  GnRH agonist: No  PPI: No  Lithium: No  SSRI: No  Actos/Avandia: No    Treatment Contraindications:   Plans for dental procedures: around 3/2024; gets teeth cleaned every 3 months    6/3/2024  Continued on Fosamax, rarely missed doses. Maybe 1-2x/ year  Continues 5000 units of vitamin D daily  Continue calcium citrate 1200 mg daily  Denies falls or fractures since our last visit   Increased exerscise by 30 minutes weekly     Interval Hx 25  Labs: 2024 creatinine 1.02, calcium 9.4, EGFR 57, TSH 2.5, free T41.4, , vitamin D 74, albumin 4.2, BSAP 8.9  Imagin2024 dxa with osteopenia, hip frax 2.9%  Since LOV, has been exercising more. Has an upcoming trip to [facility].   No falls or fractures since LOV   Denies kidney stones  Continues 5000 units of vitamin D + K daily  Continue calcium citrate 1200 mg daily          Allergies, PMH, SocHx and FHx reviewed and updated as appropriate in Epic on   No outpatient medications have been marked as taking for the 25 encounter (Office Visit) with Alia Hernandez DO.     No Known Allergies  Current Outpatient Medications   Medication Sig Dispense Refill    ALENDRONATE 70 MG Oral Tab TAKE 1 TABLET BY MOUTH ONE TIME A WEEK 12 tablet 0    rosuvastatin 10 MG Oral Tab Take 1 tablet (10 mg total) by mouth nightly. 90 tablet 3    methylPREDNISolone (MEDROL) 4 MG Oral Tablet Therapy Pack As directed. 1 each 0    cyclobenzaprine 5 MG Oral Tab Take 1 tablet (5 mg total) by mouth nightly as needed for Muscle spasms. 30 tablet 0    levothyroxine 112 MCG Oral Tab Take 1 tablet (112 mcg total) by mouth before breakfast. 90 tablet 0    Estradiol 10 MCG Vaginal Tab Place one tablet into the vagina at bedtime for two weeks and then three times weekly      Cholecalciferol 125 MCG (5000 UT) Oral Tab Take 1 tablet (5,000 Units total) by mouth daily.      Calcium Carbonate-Vit D-Min (CALTRATE 600+D PLUS MINERALS OR) Take by mouth daily.       Multiple Vitamin (MULTIVITAMIN ADULT OR) Take 1 tablet by mouth daily.      Nutritional Supplements (JUICE PLUS FIBRE OR) Take by mouth 2 (two) times a day.      Sertraline HCl (ZOLOFT) 100 MG Oral Tab Take 1 tablet (100 mg total) by mouth daily.      BuPROPion HCl (WELLBUTRIN XL) 150 MG Oral Tablet SR 24 Hr Take  by mouth daily.      FISH OIL CONCENTRATE 1000 MG OR CAPS 6 grams daily       Past Medical History:    allergic rhinitis    anemia    in past and egd and colon neg    Arthritis    Cancer (HCC)    basal cell cancer    Depression    Esophageal reflux    Fatigue    Food intolerance    gluten    gluten    High cholesterol    hypothyroidism    Irregular bowel habits    osteopenia    Osteoporosis    Sleep apnea    cpap     Past Surgical History:   Procedure Laterality Date    Cataract  04/2021    both eyes    Colonoscopy      2002 Dr. Mustafa    Colonoscopy  01/2010    due 2015    Other  2000    ductectomy rt breast    Other surgical history      laproscopy 82, cervical dysplasia with cone biopsy 84    Other surgical history Bilateral 2018    oopherectomy    Skin surgery  12/07/2016    MMS for BCC-infil to the L Nares-AB    Skin surgery      BCC to Left Superior Nasolabial Fold mms dr gasca     Social History     Socioeconomic History    Marital status:    Tobacco Use    Smoking status: Never    Smokeless tobacco: Never   Vaping Use    Vaping status: Never Used   Substance and Sexual Activity    Alcohol use: Yes     Alcohol/week: 2.0 standard drinks of alcohol     Types: 2 Glasses of wine per week    Drug use: No     Social Drivers of Health      [facility]    Housing Stability     Family History   Problem Relation Age of Onset    Diabetes Father     Hypertension Mother     Cancer Mother         melanoma    Cancer Brother         melanoma       REVIEW OF SYSTEMS: 10 point ROS completed, refer to HPI for pertinent positives    Objective:    PHYSICAL EXAMINATION:  Vital Signs:   Vitals:    02/21/25 0857   BP: 140/78   Pulse: 67        General Appearance:  Alert, in no acute distress, well developed  Eyes:  normal conjunctivae, sclera  Ears/Nose/Mouth/Throat/Neck:  normal hearing, normal speech and no palpable thyroid nodules  Respiratory:  breathing comfortably on room air, no accessory muscle usage  Cardiovascular:  regular rate and rhythm, no peripheral edema  Psychiatric:  Oriented to person, place and time, appropriate mood & affect  Skin: Normal moisture and skin texture  Neuro: sensory grossly intact, motor grossly intact.      Recent Labs: Personally reviewed in [employer] under lab tab.    Radiology:  Independently visualized pertinent imaging.  I reviewed the patient's records from outside facility which revealed: osteopenia    DXA Scans:  Date L2-L4 BMD T-score % change Mean Femoral Neck BMD T-score % change   12/21/2023 0.887 -1.5 0.698 -2   12/14/2022  -2.2  -1.7           ASSESSMENT/PLAN:  Gena Denis is a 74 year old female seen in consultation for:    Age-related osteoporosis without current pathological fracture  Discussed pathophysiology of bone loss and clinical significance of DEXA scans with the patient.  The patient has confirmed osteopenia with low T-scores in the mean femoral neck. The patient's vertebral compression fracture history indicates poor bone quality and osteoporosis.  Explained the patient's risk factors for osteoporosis including age, family history, post-menopause    Discussed the importance of adopting lifestyle measures, such as adequate calcium and vitamin D intake, exercise, smoking cessation, counseling on fall prevention, and avoidance of heavy alcohol use, to reduce bone loss in postmenopausal women.  Suggested 1200 mg of elemental calcium daily (total diet plus supplement) and 1000 IU of vitamin D daily as general recommendations.     Patient has previously completed 3 years of fosamax.   We reviewed redd  secondary evaluation and her bone turnover markers 5/2024 which were on the low end of the range  Since her compression fracture was seen in 2023 but we are unsure when this happened from [id number] so it is difficult to say if this is bisphosphonate failure  Since she has not fractured outside of above episode, and since her bone markers are on the low end which shows her fosamax is likely absorbing, we reviewed options including repeat bone density 12/2024 with bone turnover markers at that time  We reviewed multiple therapeutic options including Prolia versus Reclast.  Patient to keep me updated regarding her final decision.  Recommended DXA every two years for patients starting on therapy, with additional evaluation for contributing factors if a clinically significant BMD decrease or new fracture occurs.      Hypothyroidism due to Hashimoto's thyroiditis  -Currently on levothyroxine 112 mcg daily, TFTs WNL 5/2024, 12/2024  -Will repeat labs prior to follow up visit   -Pt verbalized understanding on proper way to take LT4 and endorses compliance with medication        The above plan was discussed in detail with the patient who verbalized understanding and agreement.      Alia Hernandez DO  [facility] Endocrinology  2/21/2025     In reviewing this note, please be advised that Dragon Voice Recognition software used to dictate the note may have made errors in recognizing some of the words or phrases.     Note to patient: The 21 Century Cures Act makes medical notes like these available to patients in the interest of transparency. However, be advised this is a medical document. It is intended as peer to peer communication. It is written in medical language and may contain abbreviations or verbiage that are unfamiliar. It may appear blunt or direct. Medical documents are intended to carry relevant information, facts as evident, and the clinical opinion of the practitioner.

## 2025-02-21 NOTE — PATIENT INSTRUCTIONS
Visit Summary  Continue calcium, vitamin D, fosamax   Let me know if you would like to start prolia or reclast. Once you have an appointment, please complete your labs 5 days prior  Have a great trip!    General follow up information:  Please let us know if you require any refills at least 1 week prior to your medication running out. If you do run out of medication, please call our office ASAP to request refills (do not wait until your follow up).  Please call us if you experience any problems with insurance coverage of medication, lab work, or imaging.   Lab results and imaging will typically be reviewed at follow up appointments, or within 3-5 business days of ALL results being in if you do not have an appointment scheduled in the near future. Our office will contact you for any abnormal results requiring more urgent follow up or action.  The on-call pager is for urgent matters only. If you are a type 1 diabetic and run out of insulin after business hours 8AM-4PM, you may call the on-call pager for a refill to a 24 hour pharmacy. If you have adrenal insufficiency and run out of steroids, you may call the on-call pager for a refill to a 24 hours pharmacy. All other refill requests should be requested during business hours.    Return Visit   [x]   Follow up pending treatment decision   [] Virtual visit  [] No follow up appointment needed     [x]  Fasting/8AM labs  []  Central scheduling # for ultrasound/nuclear med/CT/MRI/DXA/IR  []  Provide flyer for:  [] ENT   [] Weight Management  [] Infertility/Reproductive Endocrinology  [] Transgender care  []  Directions to 1st floor lab  [] Collect urine collection jug  [] Collect salivary cortisol tubes  [x]  Dental clearance form  []  Will need authorization for outside records

## 2025-02-26 DIAGNOSIS — E06.3 HYPOTHYROIDISM DUE TO HASHIMOTO'S THYROIDITIS: ICD-10-CM

## 2025-02-26 RX ORDER — LEVOTHYROXINE SODIUM 112 UG/1
112 TABLET ORAL
Qty: 90 TABLET | Refills: 0 | Status: SHIPPED | OUTPATIENT
Start: 2025-02-26

## 2025-02-26 NOTE — TELEPHONE ENCOUNTER
Endocrine refill protocol for medications for hypothyroidism and hyperthyroidism    Protocol Criteria:  PASSED Reason: N/A    If all below requirements are met, send a 90-day supply with 1 refill per provider protocol.    Verify appointment with Endocrinology completed in the last 12 months or scheduled in the next 6 months.    Normal TSH result in the past 12 months   Review recent telephone encounters and mychart communications with patient to ensure a dose change has not occurred since last office visit that was not updated in the medication history list     Last completed office visit:2/21/2025 Alia Hernandez DO   Next scheduled Follow up:   Future Appointments   Date Time Provider Department Center   3/11/2025 10:00 AM [facility] NURSE [facility]      Last TSH result:   TSH   Date Value Ref Range Status   12/19/2024 2.516 0.550 - 4.780 uIU/mL Final

## 2025-03-07 ENCOUNTER — TELEPHONE (OUTPATIENT)
Facility: CLINIC | Age: 75
End: 2025-03-07

## 2025-03-07 NOTE — TELEPHONE ENCOUNTER
Received dental clearance- patient is cleared. Last visit with dentist 3/5/25.    Form placed in RN brown folder.

## 2025-03-11 ENCOUNTER — NURSE ONLY (OUTPATIENT)
Dept: INTERNAL MEDICINE CLINIC | Facility: CLINIC | Age: 75
End: 2025-03-11
Payer: MEDICARE

## 2025-03-11 DIAGNOSIS — E53.8 VITAMIN B12 DEFICIENCY: Primary | ICD-10-CM

## 2025-03-11 PROCEDURE — 96372 THER/PROPH/DIAG INJ SC/IM: CPT | Performed by: INTERNAL MEDICINE

## 2025-03-11 RX ORDER — CYANOCOBALAMIN 1000 UG/ML
1000 INJECTION, SOLUTION INTRAMUSCULAR; SUBCUTANEOUS ONCE
Status: COMPLETED | OUTPATIENT
Start: 2025-03-11 | End: 2025-03-11

## 2025-03-11 RX ADMIN — CYANOCOBALAMIN 1000 MCG: 1000 INJECTION, SOLUTION INTRAMUSCULAR; SUBCUTANEOUS at 10:30:00

## 2025-05-08 NOTE — TELEPHONE ENCOUNTER
Let me know if you would like to start prolia or reclast. Once you have an appointment, please complete your labs 5 days prior     Patient phoned in and would like to start Prolia    Reviewed that labs are ordered in the sytem and can be done within 30 day so of injection.    Dental clearance on file.    Entered into [employer] portal, awaiting packet.

## 2025-05-09 NOTE — TELEPHONE ENCOUNTER
Fax From: [employer]     Summary of Benefits     Prolia Injection  Benefits     Place in [address]

## 2025-05-14 NOTE — TELEPHONE ENCOUNTER
Reviewed [employer] report: Prolia and administration will be subject to 20% coinsurance up to $1300 out of pocket max. Once met, coverage increases to 100%.     Prior Authorization is required: call [phone] or access site.     Filled out and placed in provider inbox for review.    Dental clearance and benefits in RN brown folder.

## 2025-05-16 ENCOUNTER — TELEPHONE (OUTPATIENT)
Facility: CLINIC | Age: 75
End: 2025-05-16

## 2025-05-16 NOTE — TELEPHONE ENCOUNTER
Faxed form to [facility] at [phone].    Awaiting determination.

## 2025-05-16 NOTE — TELEPHONE ENCOUNTER
Faxed from [facility] behalf of [facility] Medicare Plan for the coverage of Prolia   Case #[id number]    Placed in [address]

## 2025-05-20 ENCOUNTER — LAB ENCOUNTER (OUTPATIENT)
Dept: LAB | Age: 75
End: 2025-05-20
Attending: STUDENT IN AN ORGANIZED HEALTH CARE EDUCATION/TRAINING PROGRAM
Payer: MEDICARE

## 2025-05-20 DIAGNOSIS — E78.5 DYSLIPIDEMIA: ICD-10-CM

## 2025-05-20 DIAGNOSIS — R74.01 ELEVATED AST (SGOT): ICD-10-CM

## 2025-05-20 DIAGNOSIS — M81.0 AGE-RELATED OSTEOPOROSIS WITHOUT CURRENT PATHOLOGICAL FRACTURE: ICD-10-CM

## 2025-05-20 LAB
ALBUMIN SERPL-MCNC: 4.7 G/DL (ref 3.2–4.8)
ALBUMIN SERPL-MCNC: 4.7 G/DL (ref 3.2–4.8)
ALP LIVER SERPL-CCNC: 49 U/L (ref 55–142)
ALT SERPL-CCNC: 21 U/L (ref 10–49)
ANION GAP SERPL CALC-SCNC: 6 MMOL/L (ref 0–18)
AST SERPL-CCNC: 35 U/L (ref ?–34)
BILIRUB DIRECT SERPL-MCNC: 0.2 MG/DL (ref ?–0.3)
BILIRUB SERPL-MCNC: 0.8 MG/DL (ref 0.2–1.1)
BUN BLD-MCNC: 19 MG/DL (ref 9–23)
CALCIUM BLD-MCNC: 9.9 MG/DL (ref 8.7–10.6)
CHLORIDE SERPL-SCNC: 107 MMOL/L (ref 98–112)
CHOLEST SERPL-MCNC: 177 MG/DL (ref ?–200)
CO2 SERPL-SCNC: 29 MMOL/L (ref 21–32)
CREAT BLD-MCNC: 1.17 MG/DL (ref 0.55–1.02)
EGFRCR SERPLBLD CKD-EPI 2021: 48 ML/MIN/1.73M2 (ref 60–?)
FASTING PATIENT LIPID ANSWER: YES
GLUCOSE BLD-MCNC: 96 MG/DL (ref 70–99)
HDLC SERPL-MCNC: 65 MG/DL (ref 40–59)
LDLC SERPL CALC-MCNC: 100 MG/DL (ref ?–100)
NONHDLC SERPL-MCNC: 112 MG/DL (ref ?–130)
OSMOLALITY SERPL CALC.SUM OF ELEC: 296 MOSM/KG (ref 275–295)
PHOSPHATE SERPL-MCNC: 3.8 MG/DL (ref 2.4–5.1)
POTASSIUM SERPL-SCNC: 4.5 MMOL/L (ref 3.5–5.1)
PROT SERPL-MCNC: 7.5 G/DL (ref 5.7–8.2)
SODIUM SERPL-SCNC: 142 MMOL/L (ref 136–145)
TRIGL SERPL-MCNC: 65 MG/DL (ref 30–149)
VIT D+METAB SERPL-MCNC: 80.8 NG/ML (ref 30–100)
VLDLC SERPL CALC-MCNC: 11 MG/DL (ref 0–30)

## 2025-05-20 PROCEDURE — 80076 HEPATIC FUNCTION PANEL: CPT

## 2025-05-20 PROCEDURE — 80061 LIPID PANEL: CPT

## 2025-05-20 PROCEDURE — 36415 COLL VENOUS BLD VENIPUNCTURE: CPT

## 2025-05-20 PROCEDURE — 82306 VITAMIN D 25 HYDROXY: CPT

## 2025-05-20 PROCEDURE — 80069 RENAL FUNCTION PANEL: CPT

## 2025-05-21 DIAGNOSIS — R74.01 ELEVATED AST (SGOT): Primary | ICD-10-CM

## 2025-05-22 NOTE — TELEPHONE ENCOUNTER
05/22/2025, Received fax approval notice from [employer] for PROLIA Soln Prf Syr 60MG/ML from 05/21/2025 to 05/21/2026    Gave to KHANH Matt

## 2025-05-22 NOTE — TELEPHONE ENCOUNTER
Per Dr. Hernandez 5/20/25 lab results, \"Calcium and vitamin D stable for Prolia\".    Received denial from [employer] stating \"you do not meet the following: You will take supplemental calcium and vitmain D\". This is incorrect- patient will be continuing Calcium and Vitamin D supplement.    Phoned [facility] at [phone], spoke with Jordon LORENZO.     Was able to review criteria on phone, and get approval for \"buy and bill\".     Prior authorization #: [id number]   Approval dates: 5/21/25-5/21/26  Call reference #: use approval pa approval number    Phoned patient and scheduled Prolia #1 injection for 5/27/25.    Closing this encounter.

## 2025-05-27 ENCOUNTER — NURSE ONLY (OUTPATIENT)
Facility: CLINIC | Age: 75
End: 2025-05-27
Payer: MEDICARE

## 2025-05-27 DIAGNOSIS — M81.0 AGE-RELATED OSTEOPOROSIS WITHOUT CURRENT PATHOLOGICAL FRACTURE: Primary | ICD-10-CM

## 2025-05-27 PROCEDURE — 96372 THER/PROPH/DIAG INJ SC/IM: CPT | Performed by: STUDENT IN AN ORGANIZED HEALTH CARE EDUCATION/TRAINING PROGRAM

## 2025-05-27 NOTE — PROGRESS NOTES
Patient in office for first time Prolia administration.    Reviewed Prolia administration, possible side effects. Reviewed to call office if any side effects post administration.     Reviewed patient should inform Dentist if any dental work to be done. No dental work currently scheduled.     Administered Prolia 60mg in left upper arm subcutaneously at 11:35 am. Patient tolerated well with no complaints.    Patient stayed in office for 15 minutes post administration. No complaints, no side effects noted.    Patient instructed to have Calcium level checked in 7-10 days- order entered.     Scheduled next follow up visit with Dr. Hernandez  for Dec 1, 2025 and Prolia #2 injection. Reminder sent to [employer] to begin benefits verification 1 month prior.     Per patient last Alendronate use was 6 day ago - May 21, 2025. Confirmed with Dr Hernandez - ok to proceed with Prolia injection.     Sent to pool for reminder 1 month prior to Prolia #2.

## 2025-06-09 DIAGNOSIS — E06.3 HYPOTHYROIDISM DUE TO HASHIMOTO'S THYROIDITIS: ICD-10-CM

## 2025-06-09 RX ORDER — LEVOTHYROXINE SODIUM 112 UG/1
112 TABLET ORAL
Qty: 90 TABLET | Refills: 1 | Status: SHIPPED | OUTPATIENT
Start: 2025-06-09

## 2025-06-09 NOTE — TELEPHONE ENCOUNTER
Endocrine refill protocol for medications for hypothyroidism and hyperthyroidism    Protocol Criteria:  PASSED Reason: N/A    If all below requirements are met, send a 90-day supply with 1 refill per provider protocol.    Verify appointment with Endocrinology completed in the last 12 months or scheduled in the next 6 months.    Normal TSH result in the past 12 months   Review recent telephone encounters and mychart communications with patient to ensure a dose change has not occurred since last office visit that was not updated in the medication history list     Last completed office visit:2/21/2025 Alia Hernandez DO   Last completed telemed visit: Visit date not found  Next scheduled Follow up:   Future Appointments   Date Time Provider Department Center   12/1/2025  1:30 PM Alia Hernandez DO [id number] [place]      Last TSH result:   TSH   Date Value Ref Range Status   12/19/2024 2.516 0.550 - 4.780 uIU/mL Final

## 2025-07-08 ENCOUNTER — TELEPHONE (OUTPATIENT)
Facility: CLINIC | Age: 75
End: 2025-07-08

## 2025-07-08 NOTE — TELEPHONE ENCOUNTER
Received a call from patient stating that she's been experiencing intermittent muscle and joint pains 4-5/10 for the past 2.5 weeks. Describes it as aching pain in both knees - worse than usual pain she has in her knees, muscle pain - especially in right quadricep. Patient received first Prolia injection on 5/27/2025 - wondering if it's related to Prolia. Patient never completed post Prolia calcium labs. Been taking Tylenol Arthritis - 650mg 2 tabs at night time. Wondering if it's ok to continue taking tylenol for the pain.     Routed for review.

## 2025-07-11 ENCOUNTER — LAB ENCOUNTER (OUTPATIENT)
Dept: LAB | Age: 75
End: 2025-07-11
Attending: STUDENT IN AN ORGANIZED HEALTH CARE EDUCATION/TRAINING PROGRAM
Payer: MEDICARE

## 2025-07-11 DIAGNOSIS — M81.0 AGE-RELATED OSTEOPOROSIS WITHOUT CURRENT PATHOLOGICAL FRACTURE: ICD-10-CM

## 2025-07-11 LAB — CALCIUM BLD-MCNC: 9.3 MG/DL (ref 8.7–10.6)

## 2025-07-11 PROCEDURE — 36415 COLL VENOUS BLD VENIPUNCTURE: CPT

## 2025-07-11 PROCEDURE — 82310 ASSAY OF CALCIUM: CPT

## 2025-07-23 ENCOUNTER — OFFICE VISIT (OUTPATIENT)
Dept: INTERNAL MEDICINE CLINIC | Facility: CLINIC | Age: 75
End: 2025-07-23
Payer: MEDICARE

## 2025-07-23 ENCOUNTER — LAB ENCOUNTER (OUTPATIENT)
Dept: LAB | Age: 75
End: 2025-07-23
Payer: MEDICARE

## 2025-07-23 VITALS
BODY MASS INDEX: 28.62 KG/M2 | HEART RATE: 62 BPM | WEIGHT: 171.81 LBS | OXYGEN SATURATION: 99 % | DIASTOLIC BLOOD PRESSURE: 76 MMHG | SYSTOLIC BLOOD PRESSURE: 122 MMHG | TEMPERATURE: 97 F | HEIGHT: 64.96 IN | RESPIRATION RATE: 18 BRPM

## 2025-07-23 DIAGNOSIS — R53.83 OTHER FATIGUE: ICD-10-CM

## 2025-07-23 DIAGNOSIS — E06.3 HYPOTHYROIDISM DUE TO HASHIMOTO'S THYROIDITIS: ICD-10-CM

## 2025-07-23 DIAGNOSIS — M25.50 POLYARTHRALGIA: ICD-10-CM

## 2025-07-23 DIAGNOSIS — M25.50 POLYARTHRALGIA: Primary | ICD-10-CM

## 2025-07-23 DIAGNOSIS — R63.5 WEIGHT GAIN: ICD-10-CM

## 2025-07-23 LAB
ALBUMIN SERPL-MCNC: 4.6 G/DL (ref 3.2–4.8)
ALBUMIN/GLOB SERPL: 1.5 {RATIO} (ref 1–2)
ALP LIVER SERPL-CCNC: 41 U/L (ref 55–142)
ALT SERPL-CCNC: 17 U/L (ref 10–49)
ANION GAP SERPL CALC-SCNC: 7 MMOL/L (ref 0–18)
AST SERPL-CCNC: 28 U/L (ref ?–34)
BILIRUB SERPL-MCNC: 0.7 MG/DL (ref 0.2–1.1)
BUN BLD-MCNC: 25 MG/DL (ref 9–23)
CALCIUM BLD-MCNC: 9.4 MG/DL (ref 8.7–10.6)
CHLORIDE SERPL-SCNC: 105 MMOL/L (ref 98–112)
CO2 SERPL-SCNC: 29 MMOL/L (ref 21–32)
CREAT BLD-MCNC: 1.1 MG/DL (ref 0.55–1.02)
CRP SERPL-MCNC: <0.5 MG/DL (ref ?–0.5)
EGFRCR SERPLBLD CKD-EPI 2021: 52 ML/MIN/1.73M2 (ref 60–?)
ERYTHROCYTE [SEDIMENTATION RATE] IN BLOOD: 10 MM/HR (ref 0–30)
FASTING STATUS PATIENT QL REPORTED: NO
GLOBULIN PLAS-MCNC: 3 G/DL (ref 2–3.5)
GLUCOSE BLD-MCNC: 82 MG/DL (ref 70–99)
MAGNESIUM SERPL-MCNC: 2.1 MG/DL (ref 1.6–2.6)
OSMOLALITY SERPL CALC.SUM OF ELEC: 295 MOSM/KG (ref 275–295)
POTASSIUM SERPL-SCNC: 4.2 MMOL/L (ref 3.5–5.1)
PROT SERPL-MCNC: 7.6 G/DL (ref 5.7–8.2)
RHEUMATOID FACT SERPL-ACNC: <3.5 IU/ML (ref ?–14)
SODIUM SERPL-SCNC: 141 MMOL/L (ref 136–145)
TSI SER-ACNC: 0.64 UIU/ML (ref 0.55–4.78)
URATE SERPL-MCNC: 4.1 MG/DL (ref 3.1–7.8)
VIT B12 SERPL-MCNC: 716 PG/ML (ref 211–911)

## 2025-07-23 PROCEDURE — 86200 CCP ANTIBODY: CPT

## 2025-07-23 PROCEDURE — 86038 ANTINUCLEAR ANTIBODIES: CPT

## 2025-07-23 PROCEDURE — 80053 COMPREHEN METABOLIC PANEL: CPT

## 2025-07-23 PROCEDURE — 83735 ASSAY OF MAGNESIUM: CPT

## 2025-07-23 PROCEDURE — 1160F RVW MEDS BY RX/DR IN RCRD: CPT

## 2025-07-23 PROCEDURE — 84550 ASSAY OF BLOOD/URIC ACID: CPT

## 2025-07-23 PROCEDURE — 86431 RHEUMATOID FACTOR QUANT: CPT

## 2025-07-23 PROCEDURE — 85652 RBC SED RATE AUTOMATED: CPT

## 2025-07-23 PROCEDURE — 82607 VITAMIN B-12: CPT

## 2025-07-23 PROCEDURE — 36415 COLL VENOUS BLD VENIPUNCTURE: CPT

## 2025-07-23 PROCEDURE — 84443 ASSAY THYROID STIM HORMONE: CPT

## 2025-07-23 PROCEDURE — 86225 DNA ANTIBODY NATIVE: CPT

## 2025-07-23 PROCEDURE — 99214 OFFICE O/P EST MOD 30 MIN: CPT

## 2025-07-23 PROCEDURE — G2211 COMPLEX E/M VISIT ADD ON: HCPCS

## 2025-07-23 PROCEDURE — 86140 C-REACTIVE PROTEIN: CPT

## 2025-07-23 PROCEDURE — 1159F MED LIST DOCD IN RCRD: CPT

## 2025-07-23 RX ORDER — DENOSUMAB 60 MG/ML
60 INJECTION SUBCUTANEOUS
COMMUNITY
Start: 2025-05-27

## 2025-07-23 RX ORDER — BACILLUS COAGULANS/INULIN 1B-250 MG
CAPSULE ORAL DAILY
COMMUNITY
Start: 2023-01-01

## 2025-07-23 RX ORDER — METHYLDOPA/HYDROCHLOROTHIAZIDE 250MG-25MG
100 TABLET ORAL DAILY
COMMUNITY
Start: 2024-11-11

## 2025-07-23 NOTE — PROGRESS NOTES
Gena Denis is a 75 year old female.   Chief Complaint   Patient presents with    Muscle Pain     ES rm - 12 -  Entire body, muscle and joint pain since 1mo at a 4/10.     HPI:      History of Present Illness  Gena Denis is a 75 year old female with hypothyroidism related to hashimoto's thyroiditis who presents with worsening joint pain and fatigue.    She experiences increased joint pain, particularly in her ankles, Achilles tendon, knees, elbows, and neck. The pain is described as shooting and unilateral, worsening over time, and exacerbated by heat. No new activities have been undertaken that might explain the pain. Muscle fatigue and tightness are also present, especially when warm.    She has a history of low B12 levels identified 5-6 years ago when she felt tired and unwell. Previously, she received B12 injections but switched to oral B12 with intrinsic factor in April to avoid injections. Her last B12 injection was in March. Despite normal B12 levels, she feels better after injections.    Increased fatigue and muscle weakness have impacted her ability to exercise. She has been working with a  for two and a half years but has had to reduce the weight she lifts due to fatigue. A significant decline in her condition was first noticed in June during a family vacation, where she felt unwell and fatigued, particularly in the heat.    Her most recent GFR was noted to be 57. She avoids NSAIDs and uses Tylenol for pain relief.    She has a history of Hashimoto's thyroiditis and is currently on levothyroxine 112 mcg. She recalls being on a higher dose in the past and notes that her thyroid function has been stable.    She has chronic vertigo but reports no new or worsening symptoms. No new rashes, changes in appetite, or significant headaches. She has noticed increased shortness of breath and fatigue.       Allergies:  Allergies[1]   Current Meds:  Current Medications[2]     PMH:   Past Medical  History[3]    ROS:   Review of Systems   Constitutional:  Positive for activity change and fatigue. Negative for appetite change, chills and fever.   HENT: Negative.     Respiratory:  Negative for cough, chest tightness, shortness of breath and wheezing.    Cardiovascular:  Negative for chest pain and palpitations.   Genitourinary: Negative.    Musculoskeletal:  Positive for arthralgias, back pain, myalgias and neck pain. Negative for gait problem, joint swelling and neck stiffness.   Skin: Negative.  Negative for rash.   Neurological: Negative.         PHYSICAL EXAM:    /76 (BP Location: Left arm, Patient Position: Sitting, Cuff Size: large)   Pulse 62   Temp 97.4 °F (36.3 °C) (Temporal)   Resp 18   Ht 5' 4.96\" (1.65 m)   Wt 171 lb 12.8 oz (77.9 kg)   SpO2 99%   BMI 28.62 kg/m²   Physical Exam  Constitutional:       Appearance: Normal appearance.   HENT:      Right Ear: Tympanic membrane normal.      Left Ear: Tympanic membrane normal.      Nose: Nose normal.      Mouth/Throat:      Mouth: Mucous membranes are moist.      Pharynx: Oropharynx is clear.   Eyes:      Conjunctiva/sclera: Conjunctivae normal.   Cardiovascular:      Rate and Rhythm: Normal rate.   Pulmonary:      Effort: Pulmonary effort is normal.      Breath sounds: Normal breath sounds.   Musculoskeletal:         General: No swelling, deformity or signs of injury.      Right lower leg: No edema.      Left lower leg: No edema.   Skin:     General: Skin is warm and dry.      Capillary Refill: Capillary refill takes less than 2 seconds.   Neurological:      Mental Status: She is alert and oriented to person, place, and time.   Psychiatric:         Mood and Affect: Mood normal.         Behavior: Behavior normal.         Thought Content: Thought content normal.         Judgment: Judgment normal.          ASSESSMENT/ PLAN:     Assessment & Plan  Widespread joint pain  Increased joint pain in multiple joints, suspected inflammatory or autoimmune  process, possibly polymyalgia rheumatica. Plans to check inflammatory markers to guide management.  - Order inflammatory markers: sed rate, CRP, ADEN, rheumatoid factor.  - Consider steroid if inflammatory markers are positive.  - Refer to rheumatology if indicated.    Thyroid dysfunction  Re-evaluation of thyroid function planned due to Hashimoto's thyroiditis and symptoms of fatigue and weight gain.  - Order thyroid function tests.    Chronic kidney disease  Chronic kidney disease with GFR of 57. Need for monitoring kidney function and avoiding NSAIDs emphasized.  - Monitor kidney function.  - Advise against NSAID use.  - Encourage adequate hydration and dietary modifications.    Vitamin B12 deficiency  Vitamin B12 deficiency managed with oral B12. Plans to check B12 levels to ensure she is not excessively high.  - Order vitamin B12 level.       Health Maintenance Due   Topic Date Due    COVID-19 Vaccine (7 - 2024-25 season) 09/01/2024    Annual Well Visit  01/01/2025    Colorectal Cancer Screening  10/14/2025            The following individual(s) verbally consented to be recorded using ambient AI listening technology and understand that they can each withdraw their consent to this listening technology at any point by asking the clinician to turn off or pause the recording:    Patient name: Gena COHEN Adeel    Pt indicates understanding and agrees to the plan.     No follow-ups on file.    DONNELL Pompa          [1] No Known Allergies  [2]   Current Outpatient Medications   Medication Sig Dispense Refill    denosumab (PROLIA) 60 MG/ML Subcutaneous Solution Prefilled Syringe Inject 1 mL (60 mg total) into the skin every 6 (six) months.      Sodium Hyaluronate, oral, (HYALURONIC ACID) 100 MG Oral Cap Take 100 mg by mouth daily.      Bacillus Coagulans-Inulin (PROBIOTIC) 1-250 BILLION-MG Oral Cap Take 1-250 mg by mouth daily.      levothyroxine 112 MCG Oral Tab TAKE 1 TABLET BY MOUTH BEFORE BREAKFAST 90 tablet  1    rosuvastatin 10 MG Oral Tab Take 1 tablet (10 mg total) by mouth nightly. 90 tablet 3    Cholecalciferol 125 MCG (5000 UT) Oral Tab Take 1 tablet (5,000 Units total) by mouth daily.      Calcium Carbonate-Vit D-Min (CALTRATE 600+D PLUS MINERALS OR) Take by mouth daily.      Multiple Vitamin (MULTIVITAMIN ADULT OR) Take 1 tablet by mouth daily.      Nutritional Supplements (JUICE PLUS FIBRE OR) Take by mouth 2 (two) times a day.      Sertraline HCl (ZOLOFT) 100 MG Oral Tab Take 1 tablet (100 mg total) by mouth daily.      BuPROPion HCl (WELLBUTRIN XL) 150 MG Oral Tablet SR 24 Hr Take  by mouth daily.      FISH OIL CONCENTRATE 1000 MG OR CAPS 6 grams daily      Estradiol 10 MCG Vaginal Tab Place one tablet into the vagina at bedtime for two weeks and then three times weekly     [3]   Past Medical History:   allergic rhinitis    anemia    in past and egd and colon neg    Arthritis    Cancer (HCC)    basal cell cancer    Depression    Esophageal reflux    Fatigue    Food intolerance    gluten    gluten    High cholesterol    hypothyroidism    Irregular bowel habits    osteopenia    Osteoporosis    Sleep apnea    cpap

## 2025-07-24 LAB
CCP IGG SERPL-ACNC: 2 U/ML (ref 0–6.9)
DSDNA IGG SERPL IA-ACNC: 0.8 IU/ML (ref ?–10)
ENA AB SER QL IA: 0.2 UG/L (ref ?–0.7)
ENA AB SER QL IA: NEGATIVE

## 2025-08-14 ENCOUNTER — LAB ENCOUNTER (OUTPATIENT)
Dept: LAB | Age: 75
End: 2025-08-14

## 2025-08-14 ENCOUNTER — OFFICE VISIT (OUTPATIENT)
Dept: INTERNAL MEDICINE CLINIC | Facility: CLINIC | Age: 75
End: 2025-08-14

## 2025-08-14 VITALS
DIASTOLIC BLOOD PRESSURE: 82 MMHG | BODY MASS INDEX: 29.02 KG/M2 | SYSTOLIC BLOOD PRESSURE: 130 MMHG | WEIGHT: 170 LBS | HEART RATE: 74 BPM | OXYGEN SATURATION: 99 % | HEIGHT: 64 IN | TEMPERATURE: 98 F | RESPIRATION RATE: 18 BRPM

## 2025-08-14 DIAGNOSIS — Z86.39 HISTORY OF THYROID NODULE: ICD-10-CM

## 2025-08-14 DIAGNOSIS — R53.83 OTHER FATIGUE: Primary | ICD-10-CM

## 2025-08-14 DIAGNOSIS — R53.83 OTHER FATIGUE: ICD-10-CM

## 2025-08-14 DIAGNOSIS — E06.3 HYPOTHYROIDISM DUE TO HASHIMOTO'S THYROIDITIS: ICD-10-CM

## 2025-08-14 LAB
DEPRECATED HBV CORE AB SER IA-ACNC: 37 NG/ML (ref 50–306)
IRON SATN MFR SERPL: 28 % (ref 15–50)
IRON SERPL-MCNC: 84 UG/DL (ref 50–170)
TOTAL IRON BINDING CAPACITY: 295 UG/DL (ref 250–425)
TRANSFERRIN SERPL-MCNC: 233 MG/DL (ref 250–380)

## 2025-08-14 PROCEDURE — 1159F MED LIST DOCD IN RCRD: CPT

## 2025-08-14 PROCEDURE — 99214 OFFICE O/P EST MOD 30 MIN: CPT

## 2025-08-14 PROCEDURE — 82728 ASSAY OF FERRITIN: CPT

## 2025-08-14 PROCEDURE — 83550 IRON BINDING TEST: CPT

## 2025-08-14 PROCEDURE — G2211 COMPLEX E/M VISIT ADD ON: HCPCS

## 2025-08-14 PROCEDURE — 83540 ASSAY OF IRON: CPT

## 2025-08-14 PROCEDURE — 36415 COLL VENOUS BLD VENIPUNCTURE: CPT

## (undated) DEVICE — DERMABOND CLOSURE 0.7ML TOPICL

## (undated) DEVICE — AIRSEAL BIFURCATED FILTERED TUBESET WITH ACTIVATED CHARCOAL FILTER: Brand: AIRSEAL

## (undated) DEVICE — 40580 - THE PINK PAD - ADVANCED TRENDELENBURG POSITIONING KIT: Brand: 40580 - THE PINK PAD - ADVANCED TRENDELENBURG POSITIONING KIT

## (undated) DEVICE — MEGA NEEDLE DRIVER: Brand: ENDOWRIST

## (undated) DEVICE — ENDOPATH ULTRA VERESS INSUFFLATION NEEDLES WITH LUER LOCK CONNECTORS: Brand: ENDOPATH

## (undated) DEVICE — FENESTRATED BIPOLAR FORCEPS: Brand: ENDOWRIST

## (undated) DEVICE — AIRSEAL 8 MM CANNULA CAP AND OBTURATOR WITH BLADELESS OPTICAL TIP COMPATIBLE WITH INTUITIVE DA VINCI XI AND DA VINCI X 8 MM INSTRUMENT CANNULA, STANDARD LENGTH: Brand: AIRSEAL

## (undated) DEVICE — STERILE POLYISOPRENE POWDER-FREE SURGICAL GLOVES WITH EMOLLIENT COATING: Brand: PROTEXIS

## (undated) DEVICE — BLADELESS OBTURATOR: Brand: WECK VISTA

## (undated) DEVICE — TRAY SURESTEP 16 BARDEX UMETR

## (undated) DEVICE — STERILE POLYISOPRENE POWDER-FREE SURGICAL GLOVES: Brand: PROTEXIS

## (undated) DEVICE — COVER WAND RF DETECT

## (undated) DEVICE — SUT MONOCRYL 4-0 PS-2 Y496G

## (undated) DEVICE — ROBOTIC GENERAL: Brand: MEDLINE INDUSTRIES, INC.

## (undated) DEVICE — SUTURE VLOC 90 2-0 9" 2145

## (undated) DEVICE — MONOPOLAR CURVED SCISSORS: Brand: ENDOWRIST

## (undated) DEVICE — ARM DRAPE

## (undated) DEVICE — Device

## (undated) DEVICE — COLUMN DRAPE

## (undated) DEVICE — LAPAROVUE VISIBILITY SYSTEM LAPAROSCOPIC SOLUTIONS: Brand: LAPAROVUE

## (undated) DEVICE — LIGHT HANDLE

## (undated) DEVICE — #15 STERILE STAINLESS BLADE: Brand: STERILE STAINLESS BLADES

## (undated) DEVICE — CANNULA SEAL

## (undated) DEVICE — SUT VICRYL 3-0 SH J416H